# Patient Record
Sex: FEMALE | Race: BLACK OR AFRICAN AMERICAN | NOT HISPANIC OR LATINO
[De-identification: names, ages, dates, MRNs, and addresses within clinical notes are randomized per-mention and may not be internally consistent; named-entity substitution may affect disease eponyms.]

---

## 2020-10-05 ENCOUNTER — TRANSCRIPTION ENCOUNTER (OUTPATIENT)
Age: 35
End: 2020-10-05

## 2020-10-05 VITALS
OXYGEN SATURATION: 100 % | SYSTOLIC BLOOD PRESSURE: 116 MMHG | RESPIRATION RATE: 18 BRPM | DIASTOLIC BLOOD PRESSURE: 74 MMHG | TEMPERATURE: 98 F | WEIGHT: 149.91 LBS | HEART RATE: 80 BPM | HEIGHT: 66 IN

## 2020-10-06 ENCOUNTER — INPATIENT (INPATIENT)
Facility: HOSPITAL | Age: 35
LOS: 3 days | Discharge: HOME | DRG: 742 | End: 2020-10-10
Attending: OBSTETRICS & GYNECOLOGY | Admitting: OBSTETRICS & GYNECOLOGY
Payer: COMMERCIAL

## 2020-10-06 DIAGNOSIS — R18.8 OTHER ASCITES: ICD-10-CM
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
## 2020-10-06 DIAGNOSIS — D25.9 LEIOMYOMA OF UTERUS, UNSPECIFIED: ICD-10-CM

## 2020-10-06 DIAGNOSIS — Z41.9 ENCOUNTER FOR PROCEDURE FOR PURPOSES OTHER THAN REMEDYING HEALTH STATE, UNSPECIFIED: Chronic | ICD-10-CM

## 2020-10-06 DIAGNOSIS — N99.840 POSTPROCEDURAL HEMATOMA OF A GENITOURINARY SYSTEM ORGAN OR STRUCTURE FOLLOWING A GENITOURINARY SYSTEM PROCEDURE: ICD-10-CM

## 2020-10-06 DIAGNOSIS — T81.44XA SEPSIS FOLLOWING A PROCEDURE, INITIAL ENCOUNTER: ICD-10-CM
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
## 2020-10-06 DIAGNOSIS — A41.9 SEPSIS, UNSPECIFIED ORGANISM: ICD-10-CM

## 2020-10-06 DIAGNOSIS — N95.0 POSTMENOPAUSAL BLEEDING: ICD-10-CM

## 2020-10-06 DIAGNOSIS — D62 ACUTE POSTHEMORRHAGIC ANEMIA: ICD-10-CM

## 2020-10-06 DIAGNOSIS — T81.43XA INFECTION FOLLOWING A PROCEDURE, ORGAN AND SPACE SURGICAL SITE, INITIAL ENCOUNTER: ICD-10-CM

## 2020-10-06 DIAGNOSIS — K66.8 OTHER SPECIFIED DISORDERS OF PERITONEUM: ICD-10-CM

## 2020-10-06 LAB
HCT VFR BLD CALC: 31.7 % — LOW (ref 34.5–45)
HGB BLD-MCNC: 9.6 G/DL — LOW (ref 11.5–15.5)
MCHC RBC-ENTMCNC: 25.5 PG — LOW (ref 27–34)
MCHC RBC-ENTMCNC: 30.3 GM/DL — LOW (ref 32–36)
MCV RBC AUTO: 84.3 FL — SIGNIFICANT CHANGE UP (ref 80–100)
NRBC # BLD: 0 /100 WBCS — SIGNIFICANT CHANGE UP (ref 0–0)
PLATELET # BLD AUTO: 182 K/UL — SIGNIFICANT CHANGE UP (ref 150–400)
RBC # BLD: 3.76 M/UL — LOW (ref 3.8–5.2)
RBC # FLD: 15.1 % — HIGH (ref 10.3–14.5)
WBC # BLD: 13.71 K/UL — HIGH (ref 3.8–10.5)
WBC # FLD AUTO: 13.71 K/UL — HIGH (ref 3.8–10.5)

## 2020-10-06 PROCEDURE — 88305 TISSUE EXAM BY PATHOLOGIST: CPT | Mod: 26

## 2020-10-06 RX ORDER — BUPIVACAINE 13.3 MG/ML
20 INJECTION, SUSPENSION, LIPOSOMAL INFILTRATION ONCE
Refills: 0 | Status: DISCONTINUED | OUTPATIENT
Start: 2020-10-06 | End: 2020-10-10

## 2020-10-06 RX ORDER — ACETAMINOPHEN 500 MG
1000 TABLET ORAL EVERY 8 HOURS
Refills: 0 | Status: DISCONTINUED | OUTPATIENT
Start: 2020-10-06 | End: 2020-10-07

## 2020-10-06 RX ORDER — HYDROMORPHONE HYDROCHLORIDE 2 MG/ML
0.5 INJECTION INTRAMUSCULAR; INTRAVENOUS; SUBCUTANEOUS
Refills: 0 | Status: DISCONTINUED | OUTPATIENT
Start: 2020-10-06 | End: 2020-10-10

## 2020-10-06 RX ORDER — SODIUM CHLORIDE 9 MG/ML
1000 INJECTION, SOLUTION INTRAVENOUS
Refills: 0 | Status: DISCONTINUED | OUTPATIENT
Start: 2020-10-06 | End: 2020-10-07

## 2020-10-06 RX ORDER — OXYCODONE HYDROCHLORIDE 5 MG/1
10 TABLET ORAL EVERY 4 HOURS
Refills: 0 | Status: DISCONTINUED | OUTPATIENT
Start: 2020-10-06 | End: 2020-10-10

## 2020-10-06 RX ORDER — KETOROLAC TROMETHAMINE 30 MG/ML
30 SYRINGE (ML) INJECTION EVERY 8 HOURS
Refills: 0 | Status: DISCONTINUED | OUTPATIENT
Start: 2020-10-06 | End: 2020-10-07

## 2020-10-06 RX ORDER — OXYCODONE HYDROCHLORIDE 5 MG/1
5 TABLET ORAL
Refills: 0 | Status: DISCONTINUED | OUTPATIENT
Start: 2020-10-06 | End: 2020-10-10

## 2020-10-06 RX ORDER — ONDANSETRON 8 MG/1
8 TABLET, FILM COATED ORAL EVERY 8 HOURS
Refills: 0 | Status: DISCONTINUED | OUTPATIENT
Start: 2020-10-06 | End: 2020-10-10

## 2020-10-06 RX ORDER — SENNA PLUS 8.6 MG/1
1 TABLET ORAL AT BEDTIME
Refills: 0 | Status: DISCONTINUED | OUTPATIENT
Start: 2020-10-06 | End: 2020-10-09

## 2020-10-06 RX ORDER — SIMETHICONE 80 MG/1
80 TABLET, CHEWABLE ORAL EVERY 6 HOURS
Refills: 0 | Status: DISCONTINUED | OUTPATIENT
Start: 2020-10-06 | End: 2020-10-10

## 2020-10-06 RX ADMIN — Medication 30 MILLIGRAM(S): at 18:03

## 2020-10-06 RX ADMIN — HYDROMORPHONE HYDROCHLORIDE 0.5 MILLIGRAM(S): 2 INJECTION INTRAMUSCULAR; INTRAVENOUS; SUBCUTANEOUS at 16:40

## 2020-10-06 RX ADMIN — HYDROMORPHONE HYDROCHLORIDE 0.5 MILLIGRAM(S): 2 INJECTION INTRAMUSCULAR; INTRAVENOUS; SUBCUTANEOUS at 16:55

## 2020-10-06 RX ADMIN — SIMETHICONE 80 MILLIGRAM(S): 80 TABLET, CHEWABLE ORAL at 22:50

## 2020-10-06 RX ADMIN — Medication 30 MILLIGRAM(S): at 18:15

## 2020-10-06 NOTE — BRIEF OPERATIVE NOTE - NSICDXBRIEFPREOP_GEN_ALL_CORE_FT
PRE-OP DIAGNOSIS:  Abnormal uterine bleeding 06-Oct-2020 13:22:36  Angeles Holliday  Fibroid uterus 06-Oct-2020 13:22:26  Angeles Holliday

## 2020-10-06 NOTE — H&P ADULT - HISTORY OF PRESENT ILLNESS
35 yo P0 presents for scheduled abdominal myomectomy for enlarge fibroid uterus with abnormal uterine bleeding. Patient reports that she feels overall well today but nervous. She denies vaginal bleeding today but endorses a history of very heavy periods with passage of clots and resulting anemia. She denies abdominal discomfort, fevers, chills, SOB, chest pain, or any complaints.

## 2020-10-06 NOTE — H&P ADULT - ASSESSMENT
35 yo P0 presents for schedule abdominal myomectomy for a large fibroid uterus and abnormal uterine bleeding.   - Admit for procedure  - NPO with IV fluids  - Consent obtained by attending Dr. Mejia   - Will give exparel for additional pain control

## 2020-10-06 NOTE — BRIEF OPERATIVE NOTE - OPERATION/FINDINGS
26 week fibroid uterus with a posterior myoma extending to the inferior edge of the liver; 20 fibroids excised; endometrial cavity entered when removing the 11 cm fibroid and reapproximated; Stratafix suture used to close all incisions. Intercede x3 placed over uterine incisions. Care taken not to disrupt the fallopian tubes or ovaries. Contreras contents remained clear throughout the entire case. Exparel with marcaine injected into the fascia and subq. Fascia closed with 0 vicryl suture. Subq closed with plain gut suture. skin closed with ensorb stapler and pressure dressing applied.

## 2020-10-06 NOTE — PROGRESS NOTE ADULT - ASSESSMENT
33yo P0 POD0 s/p abdominal myomectomy, uncomplicated                                   1. Neuro/Pain:  controlled with tylenol 1000mg Q6H, dilaudid 0.5mg Q15min PRN, oxycodone 5 &10mg prn, toradol 30mg Q8H   2  CV:   VS per routine  3. Pulm: Encourage ISS  4. GI: clear liquid diet  5. :  Contreras in place, TOV in AM  6. Heme: AM CBC  8. DVT ppx: SCDs  9. Dispo: continue inpatient care   35yo P0 POD0 s/p abdominal myomectomy                                   1. Neuro/Pain:  controlled with tylenol 1000mg Q6H, dilaudid 0.5mg Q15min PRN, oxycodone 5 &10mg prn, toradol 30mg Q8H   2  CV:   VS per routine  3. Pulm: Encourage ISS  4. GI: clear liquid diet  5. :  Contreras in place, TOV in AM  6. Heme: AM CBC  8. DVT ppx: SCDs  9. Dispo: continue inpatient care

## 2020-10-06 NOTE — PROGRESS NOTE ADULT - SUBJECTIVE AND OBJECTIVE BOX
GYN POST-OPERATIVE CHECK  Patient seen at bedside.  Pain controlled. Tolerating clear liquid diet.  No OOB yet.  Contreras in place. Reports feeling drowsy. No flatus yet.    Denies CP, palpitations, SOB, fever, chills, nausea, vomiting.    Vital Signs Last 24 Hrs  T(C): 36.7 (06 Oct 2020 13:15), Max: 36.7 (06 Oct 2020 13:15)  T(F): 98 (06 Oct 2020 13:15), Max: 98 (06 Oct 2020 13:15)  HR: 82 (06 Oct 2020 15:15) (74 - 88)  BP: 95/57 (06 Oct 2020 15:15) (88/49 - 95/57)  BP(mean): 71 (06 Oct 2020 15:15) (65 - 71)  RR: 18 (06 Oct 2020 15:15) (10 - 19)  SpO2: 100% (06 Oct 2020 15:15) (96% - 100%)    Physical Exam:  Gen: No Acute Distress  Pulm: Clear to auscultation bilaterally  GI: soft, appropriately tender, non-distended, +BS, no rebound, no guarding.  Dressing C/D/I  : Contreras in place  Ext: SCDs in place, no edema/erythema/tenderness    I&O's Summary    06 Oct 2020 07:01  -  06 Oct 2020 18:52  --------------------------------------------------------  IN: 750 mL / OUT: 140 mL / NET: 610 mL      MEDICATIONS  (STANDING):  acetaminophen   Tablet .. 1000 milliGRAM(s) Oral every 8 hours  BUpivacaine liposome 1.3% Injectable (no eMAR) 20 milliLiter(s) Local Injection once  ketorolac   Injectable 30 milliGRAM(s) IV Push every 8 hours  lactated ringers. 1000 milliLiter(s) (125 mL/Hr) IV Continuous <Continuous>    MEDICATIONS  (PRN):  HYDROmorphone  Injectable 0.5 milliGRAM(s) IV Push every 15 minutes PRN Severe Pain (7 - 10)  ondansetron    Tablet 8 milliGRAM(s) Oral every 8 hours PRN Nausea and/or Vomiting  oxyCODONE    IR 5 milliGRAM(s) Oral every 3 hours PRN Moderate Pain (4 - 6)  oxyCODONE    IR 10 milliGRAM(s) Oral every 4 hours PRN Severe Pain (7 - 10)  senna 1 Tablet(s) Oral at bedtime PRN Constipation  simethicone 80 milliGRAM(s) Chew every 6 hours PRN Gas    Allergies    No Known Allergies    Intolerances        LABS:                        9.6    13.71 )-----------( 182      ( 06 Oct 2020 13:53 )             31.7

## 2020-10-07 LAB
ANION GAP SERPL CALC-SCNC: 7 MMOL/L — SIGNIFICANT CHANGE UP (ref 5–17)
BUN SERPL-MCNC: 9 MG/DL — SIGNIFICANT CHANGE UP (ref 7–23)
CALCIUM SERPL-MCNC: 7.4 MG/DL — LOW (ref 8.4–10.5)
CHLORIDE SERPL-SCNC: 105 MMOL/L — SIGNIFICANT CHANGE UP (ref 96–108)
CO2 SERPL-SCNC: 25 MMOL/L — SIGNIFICANT CHANGE UP (ref 22–31)
CREAT SERPL-MCNC: 0.77 MG/DL — SIGNIFICANT CHANGE UP (ref 0.5–1.3)
GLUCOSE SERPL-MCNC: 127 MG/DL — HIGH (ref 70–99)
HCT VFR BLD CALC: 18.9 % — CRITICAL LOW (ref 34.5–45)
HCT VFR BLD CALC: 24.7 % — LOW (ref 34.5–45)
HGB BLD-MCNC: 5.9 G/DL — CRITICAL LOW (ref 11.5–15.5)
HGB BLD-MCNC: 8 G/DL — LOW (ref 11.5–15.5)
MCHC RBC-ENTMCNC: 25.5 PG — LOW (ref 27–34)
MCHC RBC-ENTMCNC: 27.7 PG — SIGNIFICANT CHANGE UP (ref 27–34)
MCHC RBC-ENTMCNC: 31.2 GM/DL — LOW (ref 32–36)
MCHC RBC-ENTMCNC: 32.4 GM/DL — SIGNIFICANT CHANGE UP (ref 32–36)
MCV RBC AUTO: 81.8 FL — SIGNIFICANT CHANGE UP (ref 80–100)
MCV RBC AUTO: 85.5 FL — SIGNIFICANT CHANGE UP (ref 80–100)
NRBC # BLD: 0 /100 WBCS — SIGNIFICANT CHANGE UP (ref 0–0)
NRBC # BLD: 0 /100 WBCS — SIGNIFICANT CHANGE UP (ref 0–0)
PLATELET # BLD AUTO: 116 K/UL — LOW (ref 150–400)
PLATELET # BLD AUTO: 138 K/UL — LOW (ref 150–400)
POTASSIUM SERPL-MCNC: 4.3 MMOL/L — SIGNIFICANT CHANGE UP (ref 3.5–5.3)
POTASSIUM SERPL-SCNC: 4.3 MMOL/L — SIGNIFICANT CHANGE UP (ref 3.5–5.3)
RBC # BLD: 2.31 M/UL — LOW (ref 3.8–5.2)
RBC # BLD: 2.89 M/UL — LOW (ref 3.8–5.2)
RBC # FLD: 14.8 % — HIGH (ref 10.3–14.5)
RBC # FLD: 15.7 % — HIGH (ref 10.3–14.5)
SODIUM SERPL-SCNC: 137 MMOL/L — SIGNIFICANT CHANGE UP (ref 135–145)
WBC # BLD: 10.33 K/UL — SIGNIFICANT CHANGE UP (ref 3.8–10.5)
WBC # BLD: 12.53 K/UL — HIGH (ref 3.8–10.5)
WBC # FLD AUTO: 10.33 K/UL — SIGNIFICANT CHANGE UP (ref 3.8–10.5)
WBC # FLD AUTO: 12.53 K/UL — HIGH (ref 3.8–10.5)

## 2020-10-07 PROCEDURE — 74174 CTA ABD&PLVS W/CONTRAST: CPT | Mod: 26

## 2020-10-07 PROCEDURE — 76700 US EXAM ABDOM COMPLETE: CPT | Mod: 26

## 2020-10-07 PROCEDURE — 76856 US EXAM PELVIC COMPLETE: CPT | Mod: 26

## 2020-10-07 RX ORDER — DIPHENHYDRAMINE HCL 50 MG
25 CAPSULE ORAL ONCE
Refills: 0 | Status: DISCONTINUED | OUTPATIENT
Start: 2020-10-07 | End: 2020-10-09

## 2020-10-07 RX ORDER — SODIUM CHLORIDE 9 MG/ML
1000 INJECTION, SOLUTION INTRAVENOUS
Refills: 0 | Status: DISCONTINUED | OUTPATIENT
Start: 2020-10-07 | End: 2020-10-09

## 2020-10-07 RX ORDER — CEFAZOLIN SODIUM 1 G
VIAL (EA) INJECTION
Refills: 0 | Status: DISCONTINUED | OUTPATIENT
Start: 2020-10-07 | End: 2020-10-10

## 2020-10-07 RX ORDER — CEFAZOLIN SODIUM 1 G
VIAL (EA) INJECTION
Refills: 0 | Status: DISCONTINUED | OUTPATIENT
Start: 2020-10-07 | End: 2020-10-07

## 2020-10-07 RX ORDER — CEFAZOLIN SODIUM 1 G
2000 VIAL (EA) INJECTION ONCE
Refills: 0 | Status: COMPLETED | OUTPATIENT
Start: 2020-10-07 | End: 2020-10-07

## 2020-10-07 RX ORDER — CEFAZOLIN SODIUM 1 G
2000 VIAL (EA) INJECTION EVERY 8 HOURS
Refills: 0 | Status: DISCONTINUED | OUTPATIENT
Start: 2020-10-08 | End: 2020-10-10

## 2020-10-07 RX ORDER — KETOROLAC TROMETHAMINE 30 MG/ML
30 SYRINGE (ML) INJECTION EVERY 6 HOURS
Refills: 0 | Status: DISCONTINUED | OUTPATIENT
Start: 2020-10-07 | End: 2020-10-09

## 2020-10-07 RX ORDER — ACETAMINOPHEN 500 MG
1000 TABLET ORAL EVERY 6 HOURS
Refills: 0 | Status: DISCONTINUED | OUTPATIENT
Start: 2020-10-07 | End: 2020-10-10

## 2020-10-07 RX ORDER — CEFAZOLIN SODIUM 1 G
2000 VIAL (EA) INJECTION ONCE
Refills: 0 | Status: DISCONTINUED | OUTPATIENT
Start: 2020-10-07 | End: 2020-10-07

## 2020-10-07 RX ADMIN — OXYCODONE HYDROCHLORIDE 10 MILLIGRAM(S): 5 TABLET ORAL at 06:35

## 2020-10-07 RX ADMIN — Medication 1000 MILLIGRAM(S): at 21:06

## 2020-10-07 RX ADMIN — OXYCODONE HYDROCHLORIDE 10 MILLIGRAM(S): 5 TABLET ORAL at 19:03

## 2020-10-07 RX ADMIN — Medication 30 MILLIGRAM(S): at 21:20

## 2020-10-07 RX ADMIN — OXYCODONE HYDROCHLORIDE 10 MILLIGRAM(S): 5 TABLET ORAL at 01:01

## 2020-10-07 RX ADMIN — OXYCODONE HYDROCHLORIDE 10 MILLIGRAM(S): 5 TABLET ORAL at 11:21

## 2020-10-07 RX ADMIN — Medication 100 MILLIGRAM(S): at 22:11

## 2020-10-07 RX ADMIN — Medication 30 MILLIGRAM(S): at 11:51

## 2020-10-07 RX ADMIN — OXYCODONE HYDROCHLORIDE 10 MILLIGRAM(S): 5 TABLET ORAL at 00:28

## 2020-10-07 RX ADMIN — Medication 30 MILLIGRAM(S): at 11:21

## 2020-10-07 RX ADMIN — Medication 1000 MILLIGRAM(S): at 22:06

## 2020-10-07 RX ADMIN — Medication 30 MILLIGRAM(S): at 03:01

## 2020-10-07 RX ADMIN — OXYCODONE HYDROCHLORIDE 5 MILLIGRAM(S): 5 TABLET ORAL at 15:35

## 2020-10-07 RX ADMIN — SODIUM CHLORIDE 125 MILLILITER(S): 9 INJECTION, SOLUTION INTRAVENOUS at 00:29

## 2020-10-07 RX ADMIN — OXYCODONE HYDROCHLORIDE 5 MILLIGRAM(S): 5 TABLET ORAL at 16:05

## 2020-10-07 RX ADMIN — SIMETHICONE 80 MILLIGRAM(S): 80 TABLET, CHEWABLE ORAL at 06:13

## 2020-10-07 RX ADMIN — OXYCODONE HYDROCHLORIDE 10 MILLIGRAM(S): 5 TABLET ORAL at 11:51

## 2020-10-07 RX ADMIN — Medication 30 MILLIGRAM(S): at 21:06

## 2020-10-07 RX ADMIN — OXYCODONE HYDROCHLORIDE 10 MILLIGRAM(S): 5 TABLET ORAL at 06:40

## 2020-10-07 RX ADMIN — SODIUM CHLORIDE 55 MILLILITER(S): 9 INJECTION, SOLUTION INTRAVENOUS at 15:15

## 2020-10-07 RX ADMIN — OXYCODONE HYDROCHLORIDE 10 MILLIGRAM(S): 5 TABLET ORAL at 18:33

## 2020-10-07 RX ADMIN — OXYCODONE HYDROCHLORIDE 5 MILLIGRAM(S): 5 TABLET ORAL at 23:02

## 2020-10-07 RX ADMIN — Medication 30 MILLIGRAM(S): at 02:46

## 2020-10-07 NOTE — PROVIDER CONTACT NOTE (CRITICAL VALUE NOTIFICATION) - ASSESSMENT
Pt appears tired. No vaginal bleeding or hematoma on abdomen. Tachycardic at 111, BP 91/60, SpO2 99%, RR 18.

## 2020-10-07 NOTE — PROGRESS NOTE ADULT - ASSESSMENT
35yo P0 POD1 s/p abdominal myomectomy. Incision dressing to be removed at around 1pm today.                                  1. Neuro/Pain:  controlled with tylenol 1000mg Q6H, dilaudid 0.5mg Q15min PRN, oxycodone 5 &10mg prn, toradol 30mg Q8H   2  CV:   VS per routine  3. Pulm: Encourage ISS  4. GI: clear liquid diet until passing flatus  5. :  Contreras to be removed this AM,  TOV in AM  6. Heme: AM CBC shows Hgb of 5.9. Will transfuse 2 units pRBC, Abd US ordered to evaluate for subcapsular hematomas.  8. DVT ppx: SCDs   9. Dispo: continue inpatient care   35yo P0 POD1 s/p abdominal myomectomy. Incision dressing to be removed at around 1pm today.                                  1. Neuro/Pain:  controlled with tylenol 1000mg Q6H, dilaudid 0.5mg Q15min PRN, oxycodone 5 &10mg prn, toradol 30mg Q8H   2  CV:   VS per routine  3. Pulm: Encourage ISS  4. GI: clear liquid diet until passing flatus  5. :  Contreras to be removed this AM,  TOV in AM  6. Heme: AM CBC shows Hgb of 5.9. Will transfuse 2 units pRBC, Abd US ordered to evaluate for subcapsular hematomas.  8. DVT ppx: SCDs. No Lovenox for now.  9. Dispo: continue inpatient care

## 2020-10-07 NOTE — PROGRESS NOTE ADULT - SUBJECTIVE AND OBJECTIVE BOX
GYN AM rounding Note.  Patient seen at bedside.  Pain controlled. Tolerating clear liquid diet.  No OOB yet.  Contreras in place. Reports feeling drowsy. No flatus yet.    Denies CP, palpitations, SOB, fever, chills, nausea, vomiting.    Vital Signs Last 24 Hrs  T(C): 36.7 (06 Oct 2020 13:15), Max: 36.7 (06 Oct 2020 13:15)  T(F): 98 (06 Oct 2020 13:15), Max: 98 (06 Oct 2020 13:15)  HR: 82 (06 Oct 2020 15:15) (74 - 88)  BP: 95/57 (06 Oct 2020 15:15) (88/49 - 95/57)  BP(mean): 71 (06 Oct 2020 15:15) (65 - 71)  RR: 18 (06 Oct 2020 15:15) (10 - 19)  SpO2: 100% (06 Oct 2020 15:15) (96% - 100%)    Physical Exam:  Gen: No Acute Distress  Pulm: Clear to auscultation bilaterally  GI: soft, appropriately tender, non-distended, +BS, no rebound, no guarding.  Dressing C/D/I  : Contreras in place  Ext: SCDs in place, no edema/erythema/tenderness    I&O's Summary    06 Oct 2020 07:01  -  06 Oct 2020 18:52  --------------------------------------------------------  IN: 750 mL / OUT: 140 mL / NET: 610 mL      MEDICATIONS  (STANDING):  acetaminophen   Tablet .. 1000 milliGRAM(s) Oral every 8 hours  BUpivacaine liposome 1.3% Injectable (no eMAR) 20 milliLiter(s) Local Injection once  ketorolac   Injectable 30 milliGRAM(s) IV Push every 8 hours  lactated ringers. 1000 milliLiter(s) (125 mL/Hr) IV Continuous <Continuous>    MEDICATIONS  (PRN):  HYDROmorphone  Injectable 0.5 milliGRAM(s) IV Push every 15 minutes PRN Severe Pain (7 - 10)  ondansetron    Tablet 8 milliGRAM(s) Oral every 8 hours PRN Nausea and/or Vomiting  oxyCODONE    IR 5 milliGRAM(s) Oral every 3 hours PRN Moderate Pain (4 - 6)  oxyCODONE    IR 10 milliGRAM(s) Oral every 4 hours PRN Severe Pain (7 - 10)  senna 1 Tablet(s) Oral at bedtime PRN Constipation  simethicone 80 milliGRAM(s) Chew every 6 hours PRN Gas    Allergies    No Known Allergies    Intolerances        LABS:                        9.6    13.71 )-----------( 182      ( 06 Oct 2020 13:53 )             31.7

## 2020-10-07 NOTE — PROGRESS NOTE ADULT - SUBJECTIVE AND OBJECTIVE BOX
Feels weak  Afebrile. P 102, (H111 at 5 AM) BP 92/57  Lungs clear  Abdomen soft, no rebound, dressing in place  Ext No C/C/E    Hb 5.9  Imp Anemia secondary to blood loss during surgery  Unlikely intra abdominal bleed    Plan  Being transfused first of 2 u PRBC now  Will get sono to r/o bleeding from myoma site   May need CT scan

## 2020-10-07 NOTE — PROGRESS NOTE ADULT - SUBJECTIVE AND OBJECTIVE BOX
C/o abdominal pain. Awake, alert and oriented  T 101.5, Pulse 127, /74.  Abdomen soft, slightly more tender than AM    Hb at 7.45 PM 8.0/24.7  Sono showed ascites and fluid around liver  CT showed no active bleeding but fluid in abdomen and around myometrium ( probably hematoma). Official report pending    Plan  Transfer to step down unit  Maintain NPO tonight  Start IV Ancef  Rpt H/H in 4 hours

## 2020-10-08 LAB
ANION GAP SERPL CALC-SCNC: 10 MMOL/L — SIGNIFICANT CHANGE UP (ref 5–17)
ANISOCYTOSIS BLD QL: SLIGHT — SIGNIFICANT CHANGE UP
BASOPHILS # BLD AUTO: 0 K/UL — SIGNIFICANT CHANGE UP (ref 0–0.2)
BASOPHILS # BLD AUTO: 0.02 K/UL — SIGNIFICANT CHANGE UP (ref 0–0.2)
BASOPHILS NFR BLD AUTO: 0 % — SIGNIFICANT CHANGE UP (ref 0–2)
BASOPHILS NFR BLD AUTO: 0.2 % — SIGNIFICANT CHANGE UP (ref 0–2)
BUN SERPL-MCNC: 8 MG/DL — SIGNIFICANT CHANGE UP (ref 7–23)
CALCIUM SERPL-MCNC: 7.8 MG/DL — LOW (ref 8.4–10.5)
CHLORIDE SERPL-SCNC: 101 MMOL/L — SIGNIFICANT CHANGE UP (ref 96–108)
CO2 SERPL-SCNC: 25 MMOL/L — SIGNIFICANT CHANGE UP (ref 22–31)
CREAT SERPL-MCNC: 0.65 MG/DL — SIGNIFICANT CHANGE UP (ref 0.5–1.3)
ELLIPTOCYTES BLD QL SMEAR: SLIGHT — SIGNIFICANT CHANGE UP
EOSINOPHIL # BLD AUTO: 0 K/UL — SIGNIFICANT CHANGE UP (ref 0–0.5)
EOSINOPHIL # BLD AUTO: 0.02 K/UL — SIGNIFICANT CHANGE UP (ref 0–0.5)
EOSINOPHIL NFR BLD AUTO: 0 % — SIGNIFICANT CHANGE UP (ref 0–6)
EOSINOPHIL NFR BLD AUTO: 0.2 % — SIGNIFICANT CHANGE UP (ref 0–6)
GIANT PLATELETS BLD QL SMEAR: PRESENT — SIGNIFICANT CHANGE UP
GLUCOSE SERPL-MCNC: 94 MG/DL — SIGNIFICANT CHANGE UP (ref 70–99)
HCT VFR BLD CALC: 22 % — LOW (ref 34.5–45)
HCT VFR BLD CALC: 26 % — LOW (ref 34.5–45)
HCT VFR BLD CALC: 26.2 % — LOW (ref 34.5–45)
HGB BLD-MCNC: 7.2 G/DL — LOW (ref 11.5–15.5)
HGB BLD-MCNC: 8.4 G/DL — LOW (ref 11.5–15.5)
HGB BLD-MCNC: 8.4 G/DL — LOW (ref 11.5–15.5)
IMM GRANULOCYTES NFR BLD AUTO: 0.5 % — SIGNIFICANT CHANGE UP (ref 0–1.5)
LYMPHOCYTES # BLD AUTO: 0.39 K/UL — LOW (ref 1–3.3)
LYMPHOCYTES # BLD AUTO: 1.42 K/UL — SIGNIFICANT CHANGE UP (ref 1–3.3)
LYMPHOCYTES # BLD AUTO: 11.9 % — LOW (ref 13–44)
LYMPHOCYTES # BLD AUTO: 2.6 % — LOW (ref 13–44)
MACROCYTES BLD QL: SLIGHT — SIGNIFICANT CHANGE UP
MAGNESIUM SERPL-MCNC: 1.7 MG/DL — SIGNIFICANT CHANGE UP (ref 1.6–2.6)
MANUAL SMEAR VERIFICATION: SIGNIFICANT CHANGE UP
MCHC RBC-ENTMCNC: 27.3 PG — SIGNIFICANT CHANGE UP (ref 27–34)
MCHC RBC-ENTMCNC: 27.5 PG — SIGNIFICANT CHANGE UP (ref 27–34)
MCHC RBC-ENTMCNC: 28 PG — SIGNIFICANT CHANGE UP (ref 27–34)
MCHC RBC-ENTMCNC: 32.1 GM/DL — SIGNIFICANT CHANGE UP (ref 32–36)
MCHC RBC-ENTMCNC: 32.3 GM/DL — SIGNIFICANT CHANGE UP (ref 32–36)
MCHC RBC-ENTMCNC: 32.7 GM/DL — SIGNIFICANT CHANGE UP (ref 32–36)
MCV RBC AUTO: 85 FL — SIGNIFICANT CHANGE UP (ref 80–100)
MCV RBC AUTO: 85.1 FL — SIGNIFICANT CHANGE UP (ref 80–100)
MCV RBC AUTO: 85.6 FL — SIGNIFICANT CHANGE UP (ref 80–100)
MONOCYTES # BLD AUTO: 0.27 K/UL — SIGNIFICANT CHANGE UP (ref 0–0.9)
MONOCYTES # BLD AUTO: 1.19 K/UL — HIGH (ref 0–0.9)
MONOCYTES NFR BLD AUTO: 1.8 % — LOW (ref 2–14)
MONOCYTES NFR BLD AUTO: 9.9 % — SIGNIFICANT CHANGE UP (ref 2–14)
NEUTROPHILS # BLD AUTO: 14.18 K/UL — HIGH (ref 1.8–7.4)
NEUTROPHILS # BLD AUTO: 9.27 K/UL — HIGH (ref 1.8–7.4)
NEUTROPHILS NFR BLD AUTO: 77.3 % — HIGH (ref 43–77)
NEUTROPHILS NFR BLD AUTO: 93.9 % — HIGH (ref 43–77)
NEUTS BAND # BLD: 1.7 % — SIGNIFICANT CHANGE UP (ref 0–8)
NRBC # BLD: 0 /100 WBCS — SIGNIFICANT CHANGE UP (ref 0–0)
NRBC # BLD: 0 /100 WBCS — SIGNIFICANT CHANGE UP (ref 0–0)
PHOSPHATE SERPL-MCNC: 2.2 MG/DL — LOW (ref 2.5–4.5)
PLAT MORPH BLD: ABNORMAL
PLATELET # BLD AUTO: 117 K/UL — LOW (ref 150–400)
PLATELET # BLD AUTO: 118 K/UL — LOW (ref 150–400)
PLATELET # BLD AUTO: 119 K/UL — LOW (ref 150–400)
POIKILOCYTOSIS BLD QL AUTO: SLIGHT — SIGNIFICANT CHANGE UP
POTASSIUM SERPL-MCNC: 3.6 MMOL/L — SIGNIFICANT CHANGE UP (ref 3.5–5.3)
POTASSIUM SERPL-SCNC: 3.6 MMOL/L — SIGNIFICANT CHANGE UP (ref 3.5–5.3)
RBC # BLD: 2.57 M/UL — LOW (ref 3.8–5.2)
RBC # BLD: 3.06 M/UL — LOW (ref 3.8–5.2)
RBC # BLD: 3.08 M/UL — LOW (ref 3.8–5.2)
RBC # FLD: 14.9 % — HIGH (ref 10.3–14.5)
RBC # FLD: 15 % — HIGH (ref 10.3–14.5)
RBC # FLD: 15 % — HIGH (ref 10.3–14.5)
RBC BLD AUTO: ABNORMAL
SODIUM SERPL-SCNC: 136 MMOL/L — SIGNIFICANT CHANGE UP (ref 135–145)
WBC # BLD: 11.98 K/UL — HIGH (ref 3.8–10.5)
WBC # BLD: 14.02 K/UL — HIGH (ref 3.8–10.5)
WBC # BLD: 14.83 K/UL — HIGH (ref 3.8–10.5)
WBC # FLD AUTO: 11.98 K/UL — HIGH (ref 3.8–10.5)
WBC # FLD AUTO: 14.02 K/UL — HIGH (ref 3.8–10.5)
WBC # FLD AUTO: 14.83 K/UL — HIGH (ref 3.8–10.5)

## 2020-10-08 RX ORDER — SODIUM,POTASSIUM PHOSPHATES 278-250MG
3 POWDER IN PACKET (EA) ORAL ONCE
Refills: 0 | Status: COMPLETED | OUTPATIENT
Start: 2020-10-08 | End: 2020-10-08

## 2020-10-08 RX ORDER — OXYCODONE HYDROCHLORIDE 5 MG/1
5 TABLET ORAL ONCE
Refills: 0 | Status: DISCONTINUED | OUTPATIENT
Start: 2020-10-08 | End: 2020-10-08

## 2020-10-08 RX ADMIN — Medication 1000 MILLIGRAM(S): at 21:41

## 2020-10-08 RX ADMIN — Medication 30 MILLIGRAM(S): at 13:58

## 2020-10-08 RX ADMIN — Medication 100 MILLIGRAM(S): at 16:23

## 2020-10-08 RX ADMIN — Medication 100 MILLIGRAM(S): at 06:05

## 2020-10-08 RX ADMIN — OXYCODONE HYDROCHLORIDE 5 MILLIGRAM(S): 5 TABLET ORAL at 00:00

## 2020-10-08 RX ADMIN — Medication 1000 MILLIGRAM(S): at 17:23

## 2020-10-08 RX ADMIN — Medication 30 MILLIGRAM(S): at 12:58

## 2020-10-08 RX ADMIN — Medication 30 MILLIGRAM(S): at 06:08

## 2020-10-08 RX ADMIN — Medication 1000 MILLIGRAM(S): at 11:30

## 2020-10-08 RX ADMIN — Medication 1000 MILLIGRAM(S): at 21:42

## 2020-10-08 RX ADMIN — OXYCODONE HYDROCHLORIDE 5 MILLIGRAM(S): 5 TABLET ORAL at 02:00

## 2020-10-08 RX ADMIN — Medication 30 MILLIGRAM(S): at 06:20

## 2020-10-08 RX ADMIN — OXYCODONE HYDROCHLORIDE 5 MILLIGRAM(S): 5 TABLET ORAL at 01:12

## 2020-10-08 RX ADMIN — SODIUM CHLORIDE 55 MILLILITER(S): 9 INJECTION, SOLUTION INTRAVENOUS at 17:38

## 2020-10-08 RX ADMIN — Medication 3 TABLET(S): at 12:59

## 2020-10-08 RX ADMIN — Medication 1000 MILLIGRAM(S): at 03:15

## 2020-10-08 RX ADMIN — Medication 1000 MILLIGRAM(S): at 10:02

## 2020-10-08 RX ADMIN — Medication 30 MILLIGRAM(S): at 19:24

## 2020-10-08 RX ADMIN — Medication 1000 MILLIGRAM(S): at 16:23

## 2020-10-08 RX ADMIN — Medication 1000 MILLIGRAM(S): at 03:45

## 2020-10-08 NOTE — PROGRESS NOTE ADULT - SUBJECTIVE AND OBJECTIVE BOX
GYN PROGRESS NOTE    Patient evaluated at the bedside. Overnight events significant for fever to 101.5. CT angio performed overnight for concern for ongoing bleeding given patient's downtrending Hb- it was negative.     Patient reports that she feels tired and hot this morning. She is very thirsty. Amenable to ice chips but knows she cannot have liquids.   Denies CP/SOB/dizziness/nausea/vomiting/calf pain. No flatus yet. Voiding without issue.        O:   T(C): 38 (10-08-20 @ 06:51), Max: 38.6 (10-07-20 @ 20:51)  HR: 107 (10-08-20 @ 05:36) (96 - 127)  BP: 116/65 (10-08-20 @ 05:36) (91/58 - 119/74)  RR: 18 (10-08-20 @ 05:36) (17 - 24)  SpO2: 95% (10-08-20 @ 05:36) (90% - 100%)  Wt(kg): --    GEN: no acute distress   LUNGS: no respiratory distress, CTAB  ABD: soft, appropriately tender, +BS, no rebound or guarding   EXT: no calf tenderness, SCDs in place         10-07 @ 07:01  -  10-08 @ 07:00  --------------------------------------------------------  IN: 1316 mL / OUT: 2100 mL / NET: -784 mL

## 2020-10-08 NOTE — PROGRESS NOTE ADULT - SUBJECTIVE AND OBJECTIVE BOX
C/O Abdominal distensio. Passing flatus  Afebrile now. VSS, pulse 101  Lungs clear  Abdomen soft, distended, minimal rebound  Voiding freely  Hb 8.4  CT reviewed with IR. No active bleeding noted, hematoma and hemoperitoneum noted    Plan  Ambulate  Advance diet as tolerated  Observe CBC's

## 2020-10-08 NOTE — CHART NOTE - NSCHARTNOTEFT_GEN_A_CORE
Admitting Diagnosis:   Patient is a 34y old  Female who presents with a chief complaint of Scheduled open abdominal myomectomy (08 Oct 2020 07:06)    Consult: Yes [   ]  No [ x  ]    Reason for Initial Nutrition Assessment: LOS Nutrition Assessment     PAST MEDICAL & SURGICAL HISTORY:  Leiomyoma of uterus  Surgery, elective  left breast lumpectomy    Current Nutrition Order: CLD     PO Intake: Good (%) [   ]  Fair (50-75%) [   ] Poor (<25%) [   ]- N/A, diet just advanced to CLD. RD to follow.     GI Issues:   WDL, last BM 10/5, Unable to flatus  No n/v/d/c noted  No abd distention noted    Pain:  7/10 suprapubic pain- currently on bowel regime    Skin Integrity:  Surgical incision, linda score 20  No edema present  No pressure ulcers noted    Labs:   10-08    136  |  101  |  8   ----------------------------<  94  3.6   |  25  |  0.65    Ca    7.8<L>      08 Oct 2020 10:30  Phos  2.2     10-08    Medications:  MEDICATIONS  (STANDING):  acetaminophen   Tablet .. 1000 milliGRAM(s) Oral every 6 hours  BUpivacaine liposome 1.3% Injectable (no eMAR) 20 milliLiter(s) Local Injection once  ceFAZolin   IVPB      ceFAZolin   IVPB 2000 milliGRAM(s) IV Intermittent every 8 hours  ketorolac   Injectable 30 milliGRAM(s) IV Push every 6 hours  lactated ringers. 1000 milliLiter(s) (55 mL/Hr) IV Continuous <Continuous>    MEDICATIONS  (PRN):  diphenhydrAMINE 25 milliGRAM(s) Oral once PRN Rash and/or Itching  HYDROmorphone  Injectable 0.5 milliGRAM(s) IV Push every 15 minutes PRN Severe Pain (7 - 10)  ondansetron    Tablet 8 milliGRAM(s) Oral every 8 hours PRN Nausea and/or Vomiting  oxyCODONE    IR 5 milliGRAM(s) Oral every 3 hours PRN Moderate Pain (4 - 6)  oxyCODONE    IR 10 milliGRAM(s) Oral every 4 hours PRN Severe Pain (7 - 10)  senna 1 Tablet(s) Oral at bedtime PRN Constipation  simethicone 80 milliGRAM(s) Chew every 6 hours PRN Gas    Admitted Anthropometrics:  Height: 66" Weight: 150lbs, IBW 130lbs+/-10%, %%, BMI 24.2 kg/m2    Weight:  10/6 150lbs    Weight Change: UBW consistent with admission weight. Please trend weight biweekly.     Nutrition Focused Physical Exam: Completed [   ]  Unable to complete [   ]- N/A    Estimated energy needs:   ABW (68kg) used for calculations as pt between % of IBW. Needs adjusted for wound healing.   Kcal (25-30 kcal/kg): 6124-8871 kcal  Protein (1.2-1.4 g/kg pro): 82-95 g pro  Fluids (25-30 ml/kg): 1162-7074 ml    Subjective:   34F with hx of Leiomyoma of uterus now POD2 s/p abdominal myomectomy 10/6 complicated by significant blood loss due to the extent of the myomas removed. Postoperative course has been complicated by anemia and fevers which are now being treated with blood transfusion and antibiotics respectively. On assessment, pt was resting in bed without complaints. Currently on CLD. Diet just advanced this am and RD unable to assess PO intake. No noted n/v/d/c. No abd distention present. Education provided on importance of adqeuate PO intake with emphasis on lean protein to support wound healing. Pt noting good PO intake PTA. UBW consistent with admission weight. NFKA. Please see nutrition recs below. RD to follow up per protocol.     Nutrition Diagnosis: Increased pro needs RT increased nutrient demand 2/2 wound healing AEB s/p abdominal myomectomy     [  ] No active nutrition diagnosis at this time  [  ] Current medical condition precludes nutrition intervention    Goal: Consistently meet >75% est needs    Recommendations:  1. CLD  >> Cont to encourage adequate PO intake with emphasis on lean protein as diet advances  2. Pain and bowel regime optimization per team  3. Monitor lytes and replete prn  4. Trend weight biweekly  5. RD diet education reenforcement prn    Education: Education provided on importance of adqeuate PO intake with emphasis on lean protein to support wound healing    Risk Level: High [   ] Moderate [ x  ] Low [   ]

## 2020-10-08 NOTE — CHART NOTE - NSCHARTNOTEFT_GEN_A_CORE
PGY2    Patient received 2 units earlier this evening. A new order of PRBC and (in the computer or via down time form was never completed). However this evening, a third unit of PRBC was administered. This was a result of miscommunication between provider and nursing due to another postoperative patient on the floor requiring a transfusion. After speaking with blood bank, this third unit of PRBC should have not been released because the order was from 10/6 and stated "hold for OR." After reviewing the patient's vital signs and large blood loss during surgery, it was still determined that the patient will complete the third unit of blood.     D/w nursing and Dr. Jackie Leon List PGY2

## 2020-10-09 LAB
ANION GAP SERPL CALC-SCNC: 9 MMOL/L — SIGNIFICANT CHANGE UP (ref 5–17)
BUN SERPL-MCNC: 7 MG/DL — SIGNIFICANT CHANGE UP (ref 7–23)
CALCIUM SERPL-MCNC: 7.9 MG/DL — LOW (ref 8.4–10.5)
CHLORIDE SERPL-SCNC: 103 MMOL/L — SIGNIFICANT CHANGE UP (ref 96–108)
CO2 SERPL-SCNC: 25 MMOL/L — SIGNIFICANT CHANGE UP (ref 22–31)
CREAT SERPL-MCNC: 0.58 MG/DL — SIGNIFICANT CHANGE UP (ref 0.5–1.3)
GLUCOSE SERPL-MCNC: 91 MG/DL — SIGNIFICANT CHANGE UP (ref 70–99)
HCT VFR BLD CALC: 24.2 % — LOW (ref 34.5–45)
HCT VFR BLD CALC: 26.6 % — LOW (ref 34.5–45)
HGB BLD-MCNC: 7.7 G/DL — LOW (ref 11.5–15.5)
HGB BLD-MCNC: 8.7 G/DL — LOW (ref 11.5–15.5)
MCHC RBC-ENTMCNC: 27.4 PG — SIGNIFICANT CHANGE UP (ref 27–34)
MCHC RBC-ENTMCNC: 28.2 PG — SIGNIFICANT CHANGE UP (ref 27–34)
MCHC RBC-ENTMCNC: 31.8 GM/DL — LOW (ref 32–36)
MCHC RBC-ENTMCNC: 32.7 GM/DL — SIGNIFICANT CHANGE UP (ref 32–36)
MCV RBC AUTO: 86.1 FL — SIGNIFICANT CHANGE UP (ref 80–100)
MCV RBC AUTO: 86.1 FL — SIGNIFICANT CHANGE UP (ref 80–100)
NRBC # BLD: 0 /100 WBCS — SIGNIFICANT CHANGE UP (ref 0–0)
NRBC # BLD: 0 /100 WBCS — SIGNIFICANT CHANGE UP (ref 0–0)
PLATELET # BLD AUTO: 130 K/UL — LOW (ref 150–400)
PLATELET # BLD AUTO: 149 K/UL — LOW (ref 150–400)
POTASSIUM SERPL-MCNC: 3.8 MMOL/L — SIGNIFICANT CHANGE UP (ref 3.5–5.3)
POTASSIUM SERPL-SCNC: 3.8 MMOL/L — SIGNIFICANT CHANGE UP (ref 3.5–5.3)
RBC # BLD: 2.81 M/UL — LOW (ref 3.8–5.2)
RBC # BLD: 3.09 M/UL — LOW (ref 3.8–5.2)
RBC # FLD: 15 % — HIGH (ref 10.3–14.5)
RBC # FLD: 15.1 % — HIGH (ref 10.3–14.5)
SODIUM SERPL-SCNC: 137 MMOL/L — SIGNIFICANT CHANGE UP (ref 135–145)
WBC # BLD: 11.43 K/UL — HIGH (ref 3.8–10.5)
WBC # BLD: 12.6 K/UL — HIGH (ref 3.8–10.5)
WBC # FLD AUTO: 11.43 K/UL — HIGH (ref 3.8–10.5)
WBC # FLD AUTO: 12.6 K/UL — HIGH (ref 3.8–10.5)

## 2020-10-09 RX ORDER — SENNA PLUS 8.6 MG/1
1 TABLET ORAL
Refills: 0 | Status: DISCONTINUED | OUTPATIENT
Start: 2020-10-09 | End: 2020-10-10

## 2020-10-09 RX ORDER — FERROUS SULFATE 325(65) MG
325 TABLET ORAL THREE TIMES A DAY
Refills: 0 | Status: DISCONTINUED | OUTPATIENT
Start: 2020-10-09 | End: 2020-10-10

## 2020-10-09 RX ORDER — MAGNESIUM SULFATE 500 MG/ML
2 VIAL (ML) INJECTION ONCE
Refills: 0 | Status: COMPLETED | OUTPATIENT
Start: 2020-10-09 | End: 2020-10-09

## 2020-10-09 RX ORDER — POTASSIUM CHLORIDE 20 MEQ
20 PACKET (EA) ORAL ONCE
Refills: 0 | Status: COMPLETED | OUTPATIENT
Start: 2020-10-09 | End: 2020-10-09

## 2020-10-09 RX ORDER — IBUPROFEN 200 MG
600 TABLET ORAL EVERY 6 HOURS
Refills: 0 | Status: DISCONTINUED | OUTPATIENT
Start: 2020-10-09 | End: 2020-10-10

## 2020-10-09 RX ADMIN — Medication 600 MILLIGRAM(S): at 12:31

## 2020-10-09 RX ADMIN — Medication 100 MILLIGRAM(S): at 16:50

## 2020-10-09 RX ADMIN — Medication 1000 MILLIGRAM(S): at 22:10

## 2020-10-09 RX ADMIN — Medication 1000 MILLIGRAM(S): at 21:21

## 2020-10-09 RX ADMIN — Medication 100 MILLIGRAM(S): at 00:30

## 2020-10-09 RX ADMIN — Medication 600 MILLIGRAM(S): at 13:30

## 2020-10-09 RX ADMIN — Medication 30 MILLIGRAM(S): at 06:01

## 2020-10-09 RX ADMIN — Medication 50 GRAM(S): at 09:38

## 2020-10-09 RX ADMIN — Medication 1000 MILLIGRAM(S): at 09:26

## 2020-10-09 RX ADMIN — SENNA PLUS 1 TABLET(S): 8.6 TABLET ORAL at 09:26

## 2020-10-09 RX ADMIN — Medication 325 MILLIGRAM(S): at 19:20

## 2020-10-09 RX ADMIN — Medication 325 MILLIGRAM(S): at 21:21

## 2020-10-09 RX ADMIN — Medication 30 MILLIGRAM(S): at 07:36

## 2020-10-09 RX ADMIN — Medication 100 MILLIGRAM(S): at 07:44

## 2020-10-09 RX ADMIN — Medication 600 MILLIGRAM(S): at 19:19

## 2020-10-09 RX ADMIN — Medication 30 MILLIGRAM(S): at 00:39

## 2020-10-09 RX ADMIN — Medication 1000 MILLIGRAM(S): at 10:00

## 2020-10-09 RX ADMIN — Medication 30 MILLIGRAM(S): at 00:30

## 2020-10-09 RX ADMIN — Medication 1000 MILLIGRAM(S): at 16:40

## 2020-10-09 RX ADMIN — Medication 325 MILLIGRAM(S): at 09:26

## 2020-10-09 RX ADMIN — Medication 20 MILLIEQUIVALENT(S): at 09:46

## 2020-10-09 NOTE — PROGRESS NOTE ADULT - SUBJECTIVE AND OBJECTIVE BOX
Feels better today. Less pain  Afebrile VSS  Lungs scattered rales lower lung fields  Abdomen soft, less distended  Wound clean and dry  Voiding freely  Hb 8.4 all day yesterday, 7.7 this AM    Plan  OOB   Regular diet  Observe H&H  May D/C home in AM or even tonight if patient chooses

## 2020-10-09 NOTE — PROGRESS NOTE ADULT - SUBJECTIVE AND OBJECTIVE BOX
GYN Progress Note    Patient seen at bedside.  Pain controlled.  Tolerating regular diet.  OOB  Voiding  +flatus    Denies CP, palpitations, SOB, fever, chills, nausea, vomiting.    Vital Signs Last 24 Hrs  T(C): 36.9 (09 Oct 2020 05:10), Max: 37.7 (09 Oct 2020 00:42)  T(F): 98.4 (09 Oct 2020 05:10), Max: 99.9 (09 Oct 2020 00:42)  HR: 90 (09 Oct 2020 05:10) (90 - 104)  BP: 115/73 (09 Oct 2020 05:10) (107/58 - 122/86)  RR: 17 (09 Oct 2020 05:10) (15 - 19)  SpO2: 98% (09 Oct 2020 05:10) (95% - 99%)    Physical Exam:  Gen: No Acute Distress  Pulm: Clear to auscultation bilaterally  GI: soft, appropriately nontender, nondistended, +BS, no rebound, no guarding.  Incision C/D/I  Ext: SCDs in place, wnl    I&O's Summary    08 Oct 2020 07:01  -  09 Oct 2020 07:00  --------------------------------------------------------  IN: 415 mL / OUT: 1950 mL / NET: -1535 mL      MEDICATIONS  (STANDING):  acetaminophen   Tablet .. 1000 milliGRAM(s) Oral every 6 hours  BUpivacaine liposome 1.3% Injectable (no eMAR) 20 milliLiter(s) Local Injection once  ceFAZolin   IVPB      ceFAZolin   IVPB 2000 milliGRAM(s) IV Intermittent every 8 hours  ketorolac   Injectable 30 milliGRAM(s) IV Push every 6 hours    MEDICATIONS  (PRN):  diphenhydrAMINE 25 milliGRAM(s) Oral once PRN Rash and/or Itching  HYDROmorphone  Injectable 0.5 milliGRAM(s) IV Push every 15 minutes PRN Severe Pain (7 - 10)  ondansetron    Tablet 8 milliGRAM(s) Oral every 8 hours PRN Nausea and/or Vomiting  oxyCODONE    IR 5 milliGRAM(s) Oral every 3 hours PRN Moderate Pain (4 - 6)  oxyCODONE    IR 10 milliGRAM(s) Oral every 4 hours PRN Severe Pain (7 - 10)  senna 1 Tablet(s) Oral at bedtime PRN Constipation  simethicone 80 milliGRAM(s) Chew every 6 hours PRN Gas      LABS:                        7.7    11.43 )-----------( 130      ( 09 Oct 2020 06:44 )             24.2     10-08    136  |  101  |  8   ----------------------------<  94  3.6   |  25  |  0.65    Ca    7.8<L>      08 Oct 2020 10:30  Phos  2.2     10-08  Mg     1.7     10-08

## 2020-10-09 NOTE — PROGRESS NOTE ADULT - ASSESSMENT
33yo P0 POD3 s/p abdominal myomectomy complicated by significant blood loss due to the extent of the myomas removed. Postoperative course has been complicated by anemia and fevers which are now being treated with blood transfusion and antibiotics respectively.                               1. Neuro/Pain: Continue Tylenol 1000mg Q6H, oxycodone 5 &10mg prn, toradol 30mg Q6H   2  CV:  keep patient on telemetry for heart rate monitoring;   3. Pulm: Encourage ISS  4. GI: regular diet  5. : voiding without issue   6. Heme: Patient now status post transfusion of 3 units prbcs; Hgb stable at 8.4 throughout yesterday. AM CBC pending. no evidence of active bleeding on CT angio   7. ID: Patient febrile overnight- no blood cultures at this time per attending; continue Ancef 2g q8H  8. DVT ppx: SCDs. No Lovenox for now.  9. Dispo: continue inpatient care

## 2020-10-10 ENCOUNTER — TRANSCRIPTION ENCOUNTER (OUTPATIENT)
Age: 35
End: 2020-10-10

## 2020-10-10 VITALS
OXYGEN SATURATION: 99 % | TEMPERATURE: 99 F | SYSTOLIC BLOOD PRESSURE: 121 MMHG | RESPIRATION RATE: 18 BRPM | HEART RATE: 88 BPM

## 2020-10-10 DIAGNOSIS — Z98.890 OTHER SPECIFIED POSTPROCEDURAL STATES: ICD-10-CM

## 2020-10-10 LAB
HCT VFR BLD CALC: 23.5 % — LOW (ref 34.5–45)
HGB BLD-MCNC: 7.5 G/DL — LOW (ref 11.5–15.5)
MCHC RBC-ENTMCNC: 27.6 PG — SIGNIFICANT CHANGE UP (ref 27–34)
MCHC RBC-ENTMCNC: 31.9 GM/DL — LOW (ref 32–36)
MCV RBC AUTO: 86.4 FL — SIGNIFICANT CHANGE UP (ref 80–100)
NRBC # BLD: 0 /100 WBCS — SIGNIFICANT CHANGE UP (ref 0–0)
PLATELET # BLD AUTO: 170 K/UL — SIGNIFICANT CHANGE UP (ref 150–400)
RBC # BLD: 2.72 M/UL — LOW (ref 3.8–5.2)
RBC # FLD: 15.3 % — HIGH (ref 10.3–14.5)
WBC # BLD: 7.9 K/UL — SIGNIFICANT CHANGE UP (ref 3.8–10.5)
WBC # FLD AUTO: 7.9 K/UL — SIGNIFICANT CHANGE UP (ref 3.8–10.5)

## 2020-10-10 PROCEDURE — 74174 CTA ABD&PLVS W/CONTRAST: CPT

## 2020-10-10 PROCEDURE — 85027 COMPLETE CBC AUTOMATED: CPT

## 2020-10-10 PROCEDURE — 36415 COLL VENOUS BLD VENIPUNCTURE: CPT

## 2020-10-10 PROCEDURE — 85025 COMPLETE CBC W/AUTO DIFF WBC: CPT

## 2020-10-10 PROCEDURE — 86850 RBC ANTIBODY SCREEN: CPT

## 2020-10-10 PROCEDURE — 84100 ASSAY OF PHOSPHORUS: CPT

## 2020-10-10 PROCEDURE — 86901 BLOOD TYPING SEROLOGIC RH(D): CPT

## 2020-10-10 PROCEDURE — 80048 BASIC METABOLIC PNL TOTAL CA: CPT

## 2020-10-10 PROCEDURE — 86923 COMPATIBILITY TEST ELECTRIC: CPT

## 2020-10-10 PROCEDURE — 76700 US EXAM ABDOM COMPLETE: CPT

## 2020-10-10 PROCEDURE — 76856 US EXAM PELVIC COMPLETE: CPT

## 2020-10-10 PROCEDURE — 83735 ASSAY OF MAGNESIUM: CPT

## 2020-10-10 PROCEDURE — P9016: CPT

## 2020-10-10 PROCEDURE — 36430 TRANSFUSION BLD/BLD COMPNT: CPT

## 2020-10-10 PROCEDURE — 88305 TISSUE EXAM BY PATHOLOGIST: CPT

## 2020-10-10 PROCEDURE — 86900 BLOOD TYPING SEROLOGIC ABO: CPT

## 2020-10-10 RX ORDER — ACETAMINOPHEN 500 MG
2 TABLET ORAL
Qty: 0 | Refills: 0 | DISCHARGE
Start: 2020-10-10

## 2020-10-10 RX ORDER — FERROUS SULFATE 325(65) MG
1 TABLET ORAL
Qty: 60 | Refills: 0
Start: 2020-10-10

## 2020-10-10 RX ORDER — IBUPROFEN 200 MG
1 TABLET ORAL
Qty: 0 | Refills: 0 | DISCHARGE
Start: 2020-10-10

## 2020-10-10 RX ORDER — DOCUSATE SODIUM 100 MG
1 CAPSULE ORAL
Qty: 60 | Refills: 0
Start: 2020-10-10

## 2020-10-10 RX ADMIN — Medication 600 MILLIGRAM(S): at 06:34

## 2020-10-10 RX ADMIN — SENNA PLUS 1 TABLET(S): 8.6 TABLET ORAL at 06:34

## 2020-10-10 RX ADMIN — Medication 600 MILLIGRAM(S): at 13:00

## 2020-10-10 RX ADMIN — Medication 100 MILLIGRAM(S): at 06:34

## 2020-10-10 RX ADMIN — Medication 325 MILLIGRAM(S): at 06:34

## 2020-10-10 RX ADMIN — Medication 325 MILLIGRAM(S): at 14:10

## 2020-10-10 RX ADMIN — Medication 600 MILLIGRAM(S): at 00:37

## 2020-10-10 RX ADMIN — SIMETHICONE 80 MILLIGRAM(S): 80 TABLET, CHEWABLE ORAL at 11:53

## 2020-10-10 RX ADMIN — Medication 600 MILLIGRAM(S): at 01:31

## 2020-10-10 RX ADMIN — Medication 600 MILLIGRAM(S): at 11:54

## 2020-10-10 RX ADMIN — Medication 600 MILLIGRAM(S): at 07:26

## 2020-10-10 RX ADMIN — Medication 100 MILLIGRAM(S): at 00:37

## 2020-10-10 NOTE — PROGRESS NOTE ADULT - ASSESSMENT
35yo P0 POD4 s/p abdominal myomectomy complicated by significant blood loss due to the extent of the myomas removed, s/p 3u PRBC. Postoperative course has been complicated by anemia and fevers which are now being treated with blood transfusion and antibiotics respectively.  Patient has been afebrile, stable H/H. Is stable and doing well postoperatively.

## 2020-10-10 NOTE — PROGRESS NOTE ADULT - SUBJECTIVE AND OBJECTIVE BOX
Feels better, had BM yesterday and today  Afebrile VSS. No fever in 48 hours  Lungs clear  Abdomen soft, uterus enlarged probably due to subcapsular hematoma  Wound clean and dry  Voiding freely  Hb 7.7 ( fluctuating between 8.7 and 7.7)  Pt is no longer febrile and had needed antibiotic to prevent possible infection of subcapsular hematoma during episode of septicemia  Plan  D/C home  Tylenol # 3 sent to [facility]

## 2020-10-10 NOTE — PROGRESS NOTE ADULT - REASON FOR ADMISSION
Scheduled open abdominal myomectomy

## 2020-10-10 NOTE — DISCHARGE NOTE PROVIDER - NSDCMRMEDTOKEN_GEN_ALL_CORE_FT
acetaminophen 500 mg oral tablet: 2 tab(s) orally every 6 hours  ibuprofen 600 mg oral tablet: 1 tab(s) orally every 6 hours   acetaminophen 500 mg oral tablet: 2 tab(s) orally every 6 hours  Colace 100 mg oral capsule: 1 cap(s) orally every 12 hours MDD:2 Tablets  ferrous sulfate 325 mg (65 mg elemental iron) oral delayed release tablet: 1 tab(s) orally every 12 hours MDD:2 Tablets  ibuprofen 600 mg oral tablet: 1 tab(s) orally every 6 hours

## 2020-10-10 NOTE — DISCHARGE NOTE PROVIDER - HOSPITAL COURSE
35yo P0 admitted for postoperative monitoring s/p abdominal myomectomy for 26wk size uterus. EBL in case was 1200cc, cell saver was used (+526). Postoperative Hg dropped to 5.8 w/associated tachycardia. Patient received a total of 3units pRBC w/appropriate rise in hemoglobin and resolution of tachycardia. Patient is stable for discharge. 33yo P0 admitted for postoperative monitoring s/p abdominal myomectomy for 26wk size uterus. EBL in case was 1200cc, cell saver was used (+526). Postoperative Hg dropped to 5.8 w/associated tachycardia. Patient received a total of 3units pRBC w/appropriate rise in hemoglobin and resolution of tachycardia. Patient is stable for discharge. She will have close follow up outpatient.

## 2020-10-10 NOTE — DISCHARGE NOTE PROVIDER - PROVIDER TOKENS
NP Note discussed with  Primary Attending    Patient is a 85y old  Male who presents with a chief complaint of Covid + (07 Jun 2020 11:23)      INTERVAL HPI/OVERNIGHT EVENTS: no new complaints. COVID - times 2    MEDICATIONS  (STANDING):  enoxaparin Injectable 40 milliGRAM(s) SubCutaneous daily  folic acid 1 milliGRAM(s) Oral daily  furosemide    Tablet 20 milliGRAM(s) Oral daily  levothyroxine 25 MICROGram(s) Oral daily  polyethylene glycol 3350 17 Gram(s) Oral daily  sertraline 25 milliGRAM(s) Oral daily    MEDICATIONS  (PRN):  acetaminophen   Tablet .. 650 milliGRAM(s) Oral every 6 hours PRN Temp greater or equal to 38C (100.4F), Mild Pain (1 - 3)  ALBUTerol    90 MICROgram(s) HFA Inhaler 2 Puff(s) Inhalation every 6 hours PRN Shortness of Breath and/or Wheezing  guaiFENesin   Syrup  (Sugar-Free) 100 milliGRAM(s) Oral every 6 hours PRN Cough      __________________________________________________  REVIEW OF SYSTEMS:    CONSTITUTIONAL: No fever,   EYES: no acute visual disturbances  NECK: No pain or stiffness  RESPIRATORY: No cough; No shortness of breath  CARDIOVASCULAR: No chest pain, no palpitations  GASTROINTESTINAL: No pain. No nausea or vomiting; No diarrhea   NEUROLOGICAL: No headache or numbness, no tremors  MUSCULOSKELETAL: No joint pain, no muscle pain  GENITOURINARY: no dysuria, no frequency, no hesitancy  PSYCHIATRY: no depression , no anxiety  ALL OTHER  ROS negative        Vital Signs Last 24 Hrs  T(C): 36.2 (08 Jun 2020 06:17), Max: 36.8 (07 Jun 2020 12:41)  T(F): 97.2 (08 Jun 2020 06:17), Max: 98.2 (07 Jun 2020 12:41)  HR: 54 (08 Jun 2020 06:17) (54 - 58)  BP: 141/66 (08 Jun 2020 06:17) (123/59 - 141/66)  BP(mean): --  RR: 16 (08 Jun 2020 06:17) (16 - 18)  SpO2: 99% (08 Jun 2020 06:17) (99% - 100%)    ________________________________________________  PHYSICAL EXAM:  GENERAL: NAD  HEENT: Normocephalic;  conjunctivae and sclerae clear; moist mucous membranes;   NECK : supple  CHEST/LUNG: Clear to auscultation bilaterally with good air entry   HEART: S1 S2  regular; no murmurs, gallops or rubs  ABDOMEN: Soft, Nontender, Nondistended; Bowel sounds present  EXTREMITIES: no cyanosis; no edema; no calf tenderness  SKIN: warm and dry; no rash  NERVOUS SYSTEM:  Awake and alert; Oriented X 2    _________________________________________________  LABS:                        12.6   6.52  )-----------( 194      ( 07 Jun 2020 08:47 )             38.9     06-08    139  |  105  |  28<H>  ----------------------------<  77  3.7   |  26  |  0.99    Ca    9.0      08 Jun 2020 07:19          CAPILLARY BLOOD GLUCOSE            RADIOLOGY & ADDITIONAL TESTS:    Imaging Personally Reviewed:  YES  < from: Xray Chest 1 View- PORTABLE-Urgent (05.15.20 @ 15:26) >  Findings: Normal heart size. Clear lungs. No pneumothorax or pleural effusion. Air over the upper abdomen is likely in the patient's high positioned transverse colon.      Impression:  1.  No active pulmonary disease.    < end of copied text >      Consultant(s) Notes Reviewed:   YES    Care Discussed with Consultants :     Plan of care was discussed with patient and /or primary care giver; all questions and concerns were addressed and care was aligned with patient's wishes. PROVIDER:[TOKEN:[[id number]]]

## 2020-10-10 NOTE — PROGRESS NOTE ADULT - SUBJECTIVE AND OBJECTIVE BOX
R2 GYN Progress Note  POD#4   HD#5    Patient seen and examined at bedside.  No acute events overnight. No acute complaints.  Pain well controlled.  Patient is ambulating and tolerating regular diet.   Patient is passing flatus; + bowel movements.   Patient is voiding spontaneously.   Denies CP, SOB, N/V, fevers, and chills.    Vital Signs Last 24 Hours  T(C): 36.6 (10-10-20 @ 01:26), Max: 37.1 (10-09-20 @ 14:01)  HR: 96 (10-10-20 @ 01:26) (88 - 96)  BP: 104/59 (10-10-20 @ 01:26) (104/59 - 126/88)  RR: 18 (10-10-20 @ 01:26) (16 - 18)  SpO2: 98% (10-10-20 @ 01:26) (98% - 100%)    I&O's Summary    08 Oct 2020 07:01  -  09 Oct 2020 07:00  --------------------------------------------------------  IN: 415 mL / OUT: 1950 mL / NET: -1535 mL    09 Oct 2020 07:01  -  10 Oct 2020 06:18  --------------------------------------------------------  IN: 50 mL / OUT: 2100 mL / NET: -2050 mL        Physical Exam:  General: NAD  CV: RR, S1, S2  Lungs: CTA b/l, good air flow b/l   Abdomen: Soft, appropriately tender to palpation, softly distended, present bowel sounds in all 4 quadrants   Incision: low transverse incision  CDI  : no bleeding on pad  Ext: warm and well perfused    Labs:                        8.7    12.60 )-----------( 149      ( 09 Oct 2020 14:13 )             26.6                           7.7    11.43 )-----------( 130      ( 09 Oct 2020 06:44 )             24.2   baso x      eos x      imm gran x      lymph x      mono x      poly x                            MEDICATIONS  (STANDING):  acetaminophen   Tablet .. 1000 milliGRAM(s) Oral every 6 hours  BUpivacaine liposome 1.3% Injectable (no eMAR) 20 milliLiter(s) Local Injection once  ceFAZolin   IVPB      ceFAZolin   IVPB 2000 milliGRAM(s) IV Intermittent every 8 hours  ferrous    sulfate 325 milliGRAM(s) Oral three times a day  ibuprofen  Tablet. 600 milliGRAM(s) Oral every 6 hours  senna 1 Tablet(s) Oral two times a day    MEDICATIONS  (PRN):  HYDROmorphone  Injectable 0.5 milliGRAM(s) IV Push every 15 minutes PRN Severe Pain (7 - 10)  ondansetron    Tablet 8 milliGRAM(s) Oral every 8 hours PRN Nausea and/or Vomiting  oxyCODONE    IR 5 milliGRAM(s) Oral every 3 hours PRN Moderate Pain (4 - 6)  oxyCODONE    IR 10 milliGRAM(s) Oral every 4 hours PRN Severe Pain (7 - 10)  simethicone 80 milliGRAM(s) Chew every 6 hours PRN Gas

## 2020-10-10 NOTE — DISCHARGE NOTE PROVIDER - CARE PROVIDER_API CALL
Gregoria Mejia  OBSTETRICS AND GYNECOLOGY  [address] [place] [address], [address]  [place], [place] [address]  Phone: [phone]  Fax: [phone]  Follow Up Time:

## 2020-10-10 NOTE — PROGRESS NOTE ADULT - PROBLEM SELECTOR PLAN 1
1. Neuro/Pain: Continue Tylenol 1000mg / ibuprofen 600mg Q6H, oxycodone 5 &10mg prn,    2  CV:  tachycardia resolved; hemodynamically stable   3. Pulm: Encourage ISS  4. GI: regular diet  5. : voiding without issue   6. Heme: Patient now status post transfusion of 3 units prbcs; CBC pending. no evidence of active bleeding on CT angio   7. ID: patient continues to be afebrile; d/c Ancef   8. DVT ppx: SCDs. No Lovenox for now.  9. Dispo: discharge planning     Edward Monsivais PGY2

## 2020-10-10 NOTE — DISCHARGE NOTE NURSING/CASE MANAGEMENT/SOCIAL WORK - PATIENT PORTAL LINK FT
You can access the FollowMyHealth Patient Portal offered by [facility] by registering at the following website: [email or web address]. By joining [employer]’s FollowMyHealth portal, you will also be able to view your health information using other applications (apps) compatible with our system.

## 2020-10-15 LAB — SURGICAL PATHOLOGY STUDY: SIGNIFICANT CHANGE UP

## 2022-07-01 NOTE — BRIEF OPERATIVE NOTE - NSICDXBRIEFPOSTOP_GEN_ALL_CORE_FT
POST-OP DIAGNOSIS:  Abnormal uterine bleeding 06-Oct-2020 13:23:10  Angeles Holliday  Fibroid uterus 06-Oct-2020 13:22:45  Angeles Holliday   Yes

## 2023-12-30 ENCOUNTER — EMERGENCY (EMERGENCY)
Facility: HOSPITAL | Age: 38
LOS: 1 days | Discharge: HOME | End: 2023-12-30
Attending: EMERGENCY MEDICINE | Admitting: EMERGENCY MEDICINE
Payer: COMMERCIAL

## 2023-12-30 VITALS
TEMPERATURE: 98 F | WEIGHT: 162.04 LBS | OXYGEN SATURATION: 100 % | RESPIRATION RATE: 18 BRPM | SYSTOLIC BLOOD PRESSURE: 161 MMHG | HEART RATE: 95 BPM | DIASTOLIC BLOOD PRESSURE: 95 MMHG

## 2023-12-30 DIAGNOSIS — Z41.9 ENCOUNTER FOR PROCEDURE FOR PURPOSES OTHER THAN REMEDYING HEALTH STATE, UNSPECIFIED: Chronic | ICD-10-CM

## 2023-12-30 DIAGNOSIS — R10.9 UNSPECIFIED ABDOMINAL PAIN: ICD-10-CM

## 2023-12-30 DIAGNOSIS — N32.89 OTHER SPECIFIED DISORDERS OF BLADDER: ICD-10-CM

## 2023-12-30 DIAGNOSIS — M54.9 DORSALGIA, UNSPECIFIED: ICD-10-CM

## 2023-12-30 DIAGNOSIS — D25.9 LEIOMYOMA OF UTERUS, UNSPECIFIED: ICD-10-CM

## 2023-12-30 LAB
ALBUMIN SERPL ELPH-MCNC: 4.1 G/DL — SIGNIFICANT CHANGE UP (ref 3.3–5)
ALBUMIN SERPL ELPH-MCNC: 4.1 G/DL — SIGNIFICANT CHANGE UP (ref 3.3–5)
ALP SERPL-CCNC: 58 U/L — SIGNIFICANT CHANGE UP (ref 40–120)
ALP SERPL-CCNC: 58 U/L — SIGNIFICANT CHANGE UP (ref 40–120)
ALT FLD-CCNC: 13 U/L — SIGNIFICANT CHANGE UP (ref 10–45)
ALT FLD-CCNC: 13 U/L — SIGNIFICANT CHANGE UP (ref 10–45)
ANION GAP SERPL CALC-SCNC: 8 MMOL/L — SIGNIFICANT CHANGE UP (ref 5–17)
ANION GAP SERPL CALC-SCNC: 8 MMOL/L — SIGNIFICANT CHANGE UP (ref 5–17)
APPEARANCE UR: CLEAR — SIGNIFICANT CHANGE UP
APPEARANCE UR: CLEAR — SIGNIFICANT CHANGE UP
AST SERPL-CCNC: 18 U/L — SIGNIFICANT CHANGE UP (ref 10–40)
AST SERPL-CCNC: 18 U/L — SIGNIFICANT CHANGE UP (ref 10–40)
BACTERIA # UR AUTO: ABNORMAL /HPF
BACTERIA # UR AUTO: ABNORMAL /HPF
BASOPHILS # BLD AUTO: 0.04 K/UL — SIGNIFICANT CHANGE UP (ref 0–0.2)
BASOPHILS # BLD AUTO: 0.04 K/UL — SIGNIFICANT CHANGE UP (ref 0–0.2)
BASOPHILS NFR BLD AUTO: 0.8 % — SIGNIFICANT CHANGE UP (ref 0–2)
BASOPHILS NFR BLD AUTO: 0.8 % — SIGNIFICANT CHANGE UP (ref 0–2)
BILIRUB SERPL-MCNC: 1 MG/DL — SIGNIFICANT CHANGE UP (ref 0.2–1.2)
BILIRUB SERPL-MCNC: 1 MG/DL — SIGNIFICANT CHANGE UP (ref 0.2–1.2)
BILIRUB UR-MCNC: NEGATIVE — SIGNIFICANT CHANGE UP
BILIRUB UR-MCNC: NEGATIVE — SIGNIFICANT CHANGE UP
BUN SERPL-MCNC: 6 MG/DL — LOW (ref 7–23)
BUN SERPL-MCNC: 6 MG/DL — LOW (ref 7–23)
CALCIUM SERPL-MCNC: 9 MG/DL — SIGNIFICANT CHANGE UP (ref 8.4–10.5)
CALCIUM SERPL-MCNC: 9 MG/DL — SIGNIFICANT CHANGE UP (ref 8.4–10.5)
CAST: 1 /LPF — SIGNIFICANT CHANGE UP (ref 0–4)
CAST: 1 /LPF — SIGNIFICANT CHANGE UP (ref 0–4)
CHLORIDE SERPL-SCNC: 105 MMOL/L — SIGNIFICANT CHANGE UP (ref 96–108)
CHLORIDE SERPL-SCNC: 105 MMOL/L — SIGNIFICANT CHANGE UP (ref 96–108)
CO2 SERPL-SCNC: 25 MMOL/L — SIGNIFICANT CHANGE UP (ref 22–31)
CO2 SERPL-SCNC: 25 MMOL/L — SIGNIFICANT CHANGE UP (ref 22–31)
COLOR SPEC: YELLOW — SIGNIFICANT CHANGE UP
COLOR SPEC: YELLOW — SIGNIFICANT CHANGE UP
CREAT SERPL-MCNC: 0.64 MG/DL — SIGNIFICANT CHANGE UP (ref 0.5–1.3)
CREAT SERPL-MCNC: 0.64 MG/DL — SIGNIFICANT CHANGE UP (ref 0.5–1.3)
DIFF PNL FLD: NEGATIVE — SIGNIFICANT CHANGE UP
DIFF PNL FLD: NEGATIVE — SIGNIFICANT CHANGE UP
EGFR: 117 ML/MIN/1.73M2 — SIGNIFICANT CHANGE UP
EGFR: 117 ML/MIN/1.73M2 — SIGNIFICANT CHANGE UP
EOSINOPHIL # BLD AUTO: 0.05 K/UL — SIGNIFICANT CHANGE UP (ref 0–0.5)
EOSINOPHIL # BLD AUTO: 0.05 K/UL — SIGNIFICANT CHANGE UP (ref 0–0.5)
EOSINOPHIL NFR BLD AUTO: 1 % — SIGNIFICANT CHANGE UP (ref 0–6)
EOSINOPHIL NFR BLD AUTO: 1 % — SIGNIFICANT CHANGE UP (ref 0–6)
GLUCOSE SERPL-MCNC: 107 MG/DL — HIGH (ref 70–99)
GLUCOSE SERPL-MCNC: 107 MG/DL — HIGH (ref 70–99)
GLUCOSE UR QL: NEGATIVE MG/DL — SIGNIFICANT CHANGE UP
GLUCOSE UR QL: NEGATIVE MG/DL — SIGNIFICANT CHANGE UP
HCT VFR BLD CALC: 37.6 % — SIGNIFICANT CHANGE UP (ref 34.5–45)
HCT VFR BLD CALC: 37.6 % — SIGNIFICANT CHANGE UP (ref 34.5–45)
HGB BLD-MCNC: 12 G/DL — SIGNIFICANT CHANGE UP (ref 11.5–15.5)
HGB BLD-MCNC: 12 G/DL — SIGNIFICANT CHANGE UP (ref 11.5–15.5)
IMM GRANULOCYTES NFR BLD AUTO: 0.2 % — SIGNIFICANT CHANGE UP (ref 0–0.9)
IMM GRANULOCYTES NFR BLD AUTO: 0.2 % — SIGNIFICANT CHANGE UP (ref 0–0.9)
KETONES UR-MCNC: NEGATIVE MG/DL — SIGNIFICANT CHANGE UP
KETONES UR-MCNC: NEGATIVE MG/DL — SIGNIFICANT CHANGE UP
LEUKOCYTE ESTERASE UR-ACNC: ABNORMAL
LEUKOCYTE ESTERASE UR-ACNC: ABNORMAL
LIDOCAIN IGE QN: 21 U/L — SIGNIFICANT CHANGE UP (ref 7–60)
LIDOCAIN IGE QN: 21 U/L — SIGNIFICANT CHANGE UP (ref 7–60)
LYMPHOCYTES # BLD AUTO: 1.62 K/UL — SIGNIFICANT CHANGE UP (ref 1–3.3)
LYMPHOCYTES # BLD AUTO: 1.62 K/UL — SIGNIFICANT CHANGE UP (ref 1–3.3)
LYMPHOCYTES # BLD AUTO: 33.4 % — SIGNIFICANT CHANGE UP (ref 13–44)
LYMPHOCYTES # BLD AUTO: 33.4 % — SIGNIFICANT CHANGE UP (ref 13–44)
MCHC RBC-ENTMCNC: 25.4 PG — LOW (ref 27–34)
MCHC RBC-ENTMCNC: 25.4 PG — LOW (ref 27–34)
MCHC RBC-ENTMCNC: 31.9 GM/DL — LOW (ref 32–36)
MCHC RBC-ENTMCNC: 31.9 GM/DL — LOW (ref 32–36)
MCV RBC AUTO: 79.7 FL — LOW (ref 80–100)
MCV RBC AUTO: 79.7 FL — LOW (ref 80–100)
MONOCYTES # BLD AUTO: 0.62 K/UL — SIGNIFICANT CHANGE UP (ref 0–0.9)
MONOCYTES # BLD AUTO: 0.62 K/UL — SIGNIFICANT CHANGE UP (ref 0–0.9)
MONOCYTES NFR BLD AUTO: 12.8 % — SIGNIFICANT CHANGE UP (ref 2–14)
MONOCYTES NFR BLD AUTO: 12.8 % — SIGNIFICANT CHANGE UP (ref 2–14)
NEUTROPHILS # BLD AUTO: 2.51 K/UL — SIGNIFICANT CHANGE UP (ref 1.8–7.4)
NEUTROPHILS # BLD AUTO: 2.51 K/UL — SIGNIFICANT CHANGE UP (ref 1.8–7.4)
NEUTROPHILS NFR BLD AUTO: 51.8 % — SIGNIFICANT CHANGE UP (ref 43–77)
NEUTROPHILS NFR BLD AUTO: 51.8 % — SIGNIFICANT CHANGE UP (ref 43–77)
NITRITE UR-MCNC: NEGATIVE — SIGNIFICANT CHANGE UP
NITRITE UR-MCNC: NEGATIVE — SIGNIFICANT CHANGE UP
NRBC # BLD: 0 /100 WBCS — SIGNIFICANT CHANGE UP (ref 0–0)
NRBC # BLD: 0 /100 WBCS — SIGNIFICANT CHANGE UP (ref 0–0)
PH UR: 6 — SIGNIFICANT CHANGE UP (ref 5–8)
PH UR: 6 — SIGNIFICANT CHANGE UP (ref 5–8)
PLATELET # BLD AUTO: 266 K/UL — SIGNIFICANT CHANGE UP (ref 150–400)
PLATELET # BLD AUTO: 266 K/UL — SIGNIFICANT CHANGE UP (ref 150–400)
POTASSIUM SERPL-MCNC: 3.5 MMOL/L — SIGNIFICANT CHANGE UP (ref 3.5–5.3)
POTASSIUM SERPL-MCNC: 3.5 MMOL/L — SIGNIFICANT CHANGE UP (ref 3.5–5.3)
POTASSIUM SERPL-SCNC: 3.5 MMOL/L — SIGNIFICANT CHANGE UP (ref 3.5–5.3)
POTASSIUM SERPL-SCNC: 3.5 MMOL/L — SIGNIFICANT CHANGE UP (ref 3.5–5.3)
PROT SERPL-MCNC: 6.7 G/DL — SIGNIFICANT CHANGE UP (ref 6–8.3)
PROT SERPL-MCNC: 6.7 G/DL — SIGNIFICANT CHANGE UP (ref 6–8.3)
PROT UR-MCNC: NEGATIVE MG/DL — SIGNIFICANT CHANGE UP
PROT UR-MCNC: NEGATIVE MG/DL — SIGNIFICANT CHANGE UP
RBC # BLD: 4.72 M/UL — SIGNIFICANT CHANGE UP (ref 3.8–5.2)
RBC # BLD: 4.72 M/UL — SIGNIFICANT CHANGE UP (ref 3.8–5.2)
RBC # FLD: 14.5 % — SIGNIFICANT CHANGE UP (ref 10.3–14.5)
RBC # FLD: 14.5 % — SIGNIFICANT CHANGE UP (ref 10.3–14.5)
RBC CASTS # UR COMP ASSIST: 0 /HPF — SIGNIFICANT CHANGE UP (ref 0–4)
RBC CASTS # UR COMP ASSIST: 0 /HPF — SIGNIFICANT CHANGE UP (ref 0–4)
SODIUM SERPL-SCNC: 138 MMOL/L — SIGNIFICANT CHANGE UP (ref 135–145)
SODIUM SERPL-SCNC: 138 MMOL/L — SIGNIFICANT CHANGE UP (ref 135–145)
SP GR SPEC: 1.01 — SIGNIFICANT CHANGE UP (ref 1–1.03)
SP GR SPEC: 1.01 — SIGNIFICANT CHANGE UP (ref 1–1.03)
SQUAMOUS # UR AUTO: 4 /HPF — SIGNIFICANT CHANGE UP (ref 0–5)
SQUAMOUS # UR AUTO: 4 /HPF — SIGNIFICANT CHANGE UP (ref 0–5)
UROBILINOGEN FLD QL: 0.2 MG/DL — SIGNIFICANT CHANGE UP (ref 0.2–1)
UROBILINOGEN FLD QL: 0.2 MG/DL — SIGNIFICANT CHANGE UP (ref 0.2–1)
WBC # BLD: 4.85 K/UL — SIGNIFICANT CHANGE UP (ref 3.8–10.5)
WBC # BLD: 4.85 K/UL — SIGNIFICANT CHANGE UP (ref 3.8–10.5)
WBC # FLD AUTO: 4.85 K/UL — SIGNIFICANT CHANGE UP (ref 3.8–10.5)
WBC # FLD AUTO: 4.85 K/UL — SIGNIFICANT CHANGE UP (ref 3.8–10.5)
WBC UR QL: 3 /HPF — SIGNIFICANT CHANGE UP (ref 0–5)
WBC UR QL: 3 /HPF — SIGNIFICANT CHANGE UP (ref 0–5)

## 2023-12-30 PROCEDURE — 74176 CT ABD & PELVIS W/O CONTRAST: CPT | Mod: MA

## 2023-12-30 PROCEDURE — 85025 COMPLETE CBC W/AUTO DIFF WBC: CPT

## 2023-12-30 PROCEDURE — 99284 EMERGENCY DEPT VISIT MOD MDM: CPT | Mod: 25

## 2023-12-30 PROCEDURE — 81001 URINALYSIS AUTO W/SCOPE: CPT

## 2023-12-30 PROCEDURE — 36415 COLL VENOUS BLD VENIPUNCTURE: CPT

## 2023-12-30 PROCEDURE — 99284 EMERGENCY DEPT VISIT MOD MDM: CPT

## 2023-12-30 PROCEDURE — 80053 COMPREHEN METABOLIC PANEL: CPT

## 2023-12-30 PROCEDURE — 83690 ASSAY OF LIPASE: CPT

## 2023-12-30 PROCEDURE — 74176 CT ABD & PELVIS W/O CONTRAST: CPT | Mod: 26,MA

## 2023-12-30 RX ORDER — KETOROLAC TROMETHAMINE 30 MG/ML
15 SYRINGE (ML) INJECTION ONCE
Refills: 0 | Status: DISCONTINUED | OUTPATIENT
Start: 2023-12-30 | End: 2023-12-30

## 2023-12-30 NOTE — ED PROVIDER NOTE - PHYSICAL EXAMINATION
VITAL SIGNS: I have reviewed nursing notes and confirm.  CONSTITUTIONAL: Well appearing, in no acute distress.   SKIN:  warm and dry, no acute rash.   HEAD:  normocephalic, atraumatic.  EYES: EOM intact; conjunctiva and sclera clear.  ENT: No nasal discharge; airway clear.   NECK: Supple.  CARD: S1, S2, Regular rate and rhythm.   RESP:  Clear to auscultation b/l, no wheezes, rales or rhonchi.  ABD: Normal bowel sounds; soft; non-distended; non-tender; no guarding/ rebound.  BACK: No spinal tenderness. No cvat. Rash.   EXT: Normal ROM. No peripheral edema. Pulses intact and equal b/l.  NEURO: Alert, oriented, motor/ sensation intact and equal b/l.   PSYCH: Cooperative, mood and affect appropriate.

## 2023-12-30 NOTE — ED PROVIDER NOTE - CLINICAL SUMMARY MEDICAL DECISION MAKING FREE TEXT BOX
Impression: 39yo f, h/o uterine fibroids, who p/w r back pain starting 2d ago, intermittent, pressure like. h/o similar type pain to r flank/ ruq since August, usually related to eating and spontaneously resolves. Pain is worse w/ movement as well. No f/c, uri sx's, abd pain currently, vomiting, diarrhea, dysuria, hematuria, urinary urgency, frequency. No saddle anesthesia, n/t/w, bowel/ bladder dysfunction. Afebrile. HDS. Abd exam benign. No cvat. NVI. Ddx includes biliary colic, cholecystitis, uti/ pyelo, renal colic, muscular back pain... Plan for labs including lfts, lipase, ua, toradol for pain. Consider ruq us to r/o gallstones or ct a/p to r/o kidney stones. Will continue to monitor. DC instructions Impression: 37yo f, h/o uterine fibroids, who p/w r back pain starting 2d ago, intermittent, pressure like. h/o similar type pain to r flank/ ruq since August, usually related to eating and spontaneously resolves. Pain is worse w/ movement as well. No f/c, uri sx's, abd pain currently, vomiting, diarrhea, dysuria, hematuria, urinary urgency, frequency. No saddle anesthesia, n/t/w, bowel/ bladder dysfunction. Afebrile. HDS. Abd exam benign. No cvat. NVI. Ddx includes biliary colic, cholecystitis, uti/ pyelo, renal colic, muscular back pain... Plan for labs including lfts, lipase, ua, toradol for pain. Consider ruq us to r/o gallstones or ct a/p to r/o kidney stones. Will continue to monitor.    Labs unremarkable w/ normal wbc, hg/hct, renal function. CT a/p neg for hydronephrosis or nephrolithiasis.  Bladder wall thickening. Cannot exclude cystitis. UA neg for infection; doubt cystitis. ED evaluation and management discussed with the patient in detail. Suspect likely muscular back pain.  Close PMD follow up encouraged.  Strict ED return instructions discussed in detail and patient given the opportunity to ask any questions about their discharge diagnosis and instructions. Patient verbalized understanding.

## 2023-12-30 NOTE — ED ADULT NURSE NOTE - OBJECTIVE STATEMENT
Patient came to ER c/o "right lower back pain". Patient denies NVD, fevers and chills but states sometimes she feels worse with meals.

## 2023-12-30 NOTE — ED PROVIDER NOTE - PATIENT PORTAL LINK FT
You can access the FollowMyHealth Patient Portal offered by [facility] by registering at the following website: [email or web address]. By joining [employer]’s FollowMyHealth portal, you will also be able to view your health information using other applications (apps) compatible with our system. You can access the FollowMyHealth Patient Portal offered by [facility] by registering at the following website: [email or web address]. By joining [employer]’s FollowMyHealth portal, you will also be able to view your health information using other applications (apps) compatible with our system.

## 2023-12-30 NOTE — ED ADULT NURSE NOTE - NSFALLUNIVINTERV_ED_ALL_ED
Bed/Stretcher in lowest position, wheels locked, appropriate side rails in place/Call bell, personal items and telephone in reach/Instruct patient to call for assistance before getting out of bed/chair/stretcher/Non-slip footwear applied when patient is off stretcher/[place] to call system/Physically safe environment - no spills, clutter or unnecessary equipment/Purposeful proactive rounding/Room/bathroom lighting operational, light cord in reach Bed/Stretcher in lowest position, wheels locked, appropriate side rails in place/Call bell, personal items and telephone in reach/Instruct patient to call for assistance before getting out of bed/chair/stretcher/Non-slip footwear applied when patient is off stretcher/[place] to call system/Physically safe environment - no spills, clutter or unnecessary equipment/Purposeful proactive rounding/Room/bathroom lighting operational, light cord in reach

## 2023-12-30 NOTE — ED PROVIDER NOTE - OBJECTIVE STATEMENT
Pt is a 37yo f, h/o uterine fibroids, who p/w r back pain starting 2d ago, intermittent, pressure like. h/o similar type pain to r flank/ ruq since August, usually related to eating and spontaneously resolves. Pain is worse w/ movement as well. No f/c, uri sx's, abd pain currently, vomiting, diarrhea, dysuria, hematuria, urinary urgency, frequency. No saddle anesthesia, n/t/w, bowel/ bladder dysfunction. Pt is a 39yo f, h/o uterine fibroids, who p/w r back pain starting 2d ago, intermittent, pressure like. h/o similar type pain to r flank/ ruq since August, usually related to eating and spontaneously resolves. Pain is worse w/ movement as well. No f/c, uri sx's, abd pain currently, vomiting, diarrhea, dysuria, hematuria, urinary urgency, frequency. No saddle anesthesia, n/t/w, bowel/ bladder dysfunction.

## 2023-12-30 NOTE — ED PROVIDER NOTE - NSFOLLOWUPINSTRUCTIONS_ED_ALL_ED_FT
Take ibuprofen/ tylenol as needed for pain.  Avoid heavy lifting/ straining.  Follow up with your primary care physician for re-evaluation.  Return to er for any new or worsening symptoms.     Acute Back Pain, Adult  Acute back pain is sudden and usually short-lived. It is often caused by an injury to the muscles and tissues in the back. The injury may result from:  A muscle, tendon, or ligament getting overstretched or torn. Ligaments are tissues that connect bones to each other. Lifting something improperly can cause a back strain.  Wear and tear (degeneration) of the spinal disks. Spinal disks are circular tissue that provide cushioning between the bones of the spine (vertebrae).  Twisting motions, such as while playing sports or doing yard work.  A hit to the back.  Arthritis.  You may have a physical exam, lab tests, and imaging tests to find the cause of your pain. Acute back pain usually goes away with rest and home care.    Follow these instructions at home:  Managing pain, stiffness, and swelling    Take over-the-counter and prescription medicines only as told by your health care provider. Treatment may include medicines for pain and inflammation that are taken by mouth or applied to the skin, or muscle relaxants.  Your health care provider may recommend applying ice during the first 24–48 hours after your pain starts. To do this:  Put ice in a plastic bag.  Place a towel between your skin and the bag.  Leave the ice on for 20 minutes, 2–3 times a day.  Remove the ice if your skin turns bright red. This is very important. If you cannot feel pain, heat, or cold, you have a greater risk of damage to the area.  If directed, apply heat to the affected area as often as told by your health care provider. Use the heat source that your health care provider recommends, such as a moist heat pack or a heating pad.  Place a towel between your skin and the heat source.  Leave the heat on for 20–30 minutes.  Remove the heat if your skin turns bright red. This is especially important if you are unable to feel pain, heat, or cold. You have a greater risk of getting burned.  Activity    Comparisons of good and bad posture while driving, standing, sitting at a desk, and lifting heavy objects.  Do not stay in bed. Staying in bed for more than 1–2 days can delay your recovery.  Sit up and stand up straight. Avoid leaning forward when you sit or hunching over when you stand.  If you work at a desk, sit close to it so you do not need to lean over. Keep your chin tucked in. Keep your neck drawn back, and keep your elbows bent at a 90-degree angle (right angle).  Sit high and close to the steering wheel when you drive. Add lower back (lumbar) support to your car seat, if needed.  Take short walks on even surfaces as soon as you are able. Try to increase the length of time you walk each day.  Do not sit, drive, or  one place for more than 30 minutes at a time. Sitting or standing for long periods of time can put stress on your back.  Do not drive or use heavy machinery while taking prescription pain medicine.  Use proper lifting techniques. When you bend and lift, use positions that put less stress on your back:  Bend your knees.  Keep the load close to your body.  Avoid twisting.  Exercise regularly as told by your health care provider. Exercising helps your back heal faster and helps prevent back injuries by keeping muscles strong and flexible.  Work with a physical therapist to make a safe exercise program, as recommended by your health care provider. Do any exercises as told by your physical therapist.  Lifestyle    Maintain a healthy weight. Extra weight puts stress on your back and makes it difficult to have good posture.  Avoid activities or situations that make you feel anxious or stressed. Stress and anxiety increase muscle tension and can make back pain worse. Learn ways to manage anxiety and stress, such as through exercise.  General instructions    Sleep on a firm mattress in a comfortable position. Try lying on your side with your knees slightly bent. If you lie on your back, put a pillow under your knees.  Keep your head and neck in a straight line with your spine (neutral position) when using electronic equipment like smartphones or pads. To do this:  Raise your smartphone or pad to look at it instead of bending your head or neck to look down.  Put the smartphone or pad at the level of your face while looking at the screen.  Follow your treatment plan as told by your health care provider. This may include:  Cognitive or behavioral therapy.  Acupuncture or massage therapy.  Meditation or yoga.  Contact a health care provider if:  You have pain that is not relieved with rest or medicine.  You have increasing pain going down into your legs or buttocks.  Your pain does not improve after 2 weeks.  You have pain at night.  You lose weight without trying.  You have a fever or chills.  You develop nausea or vomiting.  You develop abdominal pain.  Get help right away if:  You develop new bowel or bladder control problems.  You have unusual weakness or numbness in your arms or legs.  You feel faint.  These symptoms may represent a serious problem that is an emergency. Do not wait to see if the symptoms will go away. Get medical help right away. Call your local emergency services (911 in the [place] Do not drive yourself to the hospital.    Summary  Acute back pain is sudden and usually short-lived.  Use proper lifting techniques. When you bend and lift, use positions that put less stress on your back.  Take over-the-counter and prescription medicines only as told by your health care provider, and apply heat or ice as told.  This information is not intended to replace advice given to you by your health care provider. Make sure you discuss any questions you have with your health care provider.    Document Revised: 03/11/2022 Document Reviewed: 03/11/2022  Elsevier Patient Education © 2023 [employer]

## 2023-12-31 VITALS
RESPIRATION RATE: 18 BRPM | DIASTOLIC BLOOD PRESSURE: 69 MMHG | HEART RATE: 81 BPM | OXYGEN SATURATION: 100 % | TEMPERATURE: 98 F | SYSTOLIC BLOOD PRESSURE: 155 MMHG

## 2023-12-31 PROBLEM — D25.9 LEIOMYOMA OF UTERUS, UNSPECIFIED: Chronic | Status: ACTIVE | Noted: 2020-10-05

## 2024-01-23 PROBLEM — Z00.00 ENCOUNTER FOR PREVENTIVE HEALTH EXAMINATION: Status: ACTIVE | Noted: 2024-01-23

## 2024-01-24 ENCOUNTER — APPOINTMENT (OUTPATIENT)
Dept: UROLOGY | Facility: CLINIC | Age: 39
End: 2024-01-24
Payer: COMMERCIAL

## 2024-01-24 VITALS
WEIGHT: 160 LBS | HEART RATE: 85 BPM | HEIGHT: 66 IN | BODY MASS INDEX: 25.71 KG/M2 | DIASTOLIC BLOOD PRESSURE: 90 MMHG | OXYGEN SATURATION: 94 % | SYSTOLIC BLOOD PRESSURE: 141 MMHG

## 2024-01-24 DIAGNOSIS — R03.0 ELEVATED BLOOD-PRESSURE READING, W/OUT DIAGNOSIS OF HYPERTENSION: ICD-10-CM

## 2024-01-24 DIAGNOSIS — N32.89 OTHER SPECIFIED DISORDERS OF BLADDER: ICD-10-CM

## 2024-01-24 DIAGNOSIS — Z82.49 FAMILY HISTORY OF ISCHEMIC HEART DISEASE AND OTHER DISEASES OF THE CIRCULATORY SYSTEM: ICD-10-CM

## 2024-01-24 DIAGNOSIS — Z78.9 OTHER SPECIFIED HEALTH STATUS: ICD-10-CM

## 2024-01-24 PROCEDURE — 99204 OFFICE O/P NEW MOD 45 MIN: CPT

## 2024-01-24 PROCEDURE — 51798 US URINE CAPACITY MEASURE: CPT

## 2024-01-24 RX ORDER — MULTIVITAMIN
CAPSULE ORAL
Refills: 0 | Status: ACTIVE | COMMUNITY

## 2024-01-24 NOTE — PHYSICAL EXAM
[Abdomen Soft] : soft [Abdomen Tenderness] : non-tender [] : no hepato-splenomegaly [Abdomen Mass (___ Cm)] : no abdominal mass palpated [Costovertebral Angle Tenderness] : no ~M costovertebral angle tenderness [de-identified] : PVR  0

## 2024-01-24 NOTE — HISTORY OF PRESENT ILLNESS
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 complaining of severe  right flank pain CT scan was normal with exception of bladder wall thickening Pain resolved has resolved Denies any urinary symptoms Patient had been treated for bacterial vaginosis   [Urinary Incontinence] : no urinary incontinence [Urinary Retention] : no urinary retention [Urinary Urgency] : no urinary urgency [Urinary Frequency] : no urinary frequency [Nocturia] : no nocturia [Straining] : no straining [Weak Stream] : no weak stream [Intermittency] : no intermittency [Post-Void Dribbling] : no post-void dribbling

## 2024-01-24 NOTE — ASSESSMENT
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  CT scan was obtained at the time of evaluation in the emergency room for right flank pain.  The pain has resolved.  There is no diagnosis associated with it.  She denies any urinary symptoms. She has no residual urine.  We discussed these findings.  CT scan was reviewed with the patient demonstrating the symmetric thickening of the bladder without any nodularity.  We discussed the fact that in the absence of urinary symptoms or findings on urinalysis no further evaluation would be warranted.  We had extensive discussion regarding her high blood pressure.  This has been noted on several occasions.  She has not had any intervention.  Emphasized the importance of this and the need to follow-up with her primary care in light of recurrent elevated bp and family history  Plan: urinalysis urine culture urine cytology PCP re BP The DOMINGA RANGEL  expressed fully understanding of the information provided, the consequences and the management.

## 2024-01-25 LAB
APPEARANCE: CLEAR
BACTERIA: NEGATIVE /HPF
BILIRUBIN URINE: NEGATIVE
BLOOD URINE: NEGATIVE
CAST: 0 /LPF
COLOR: YELLOW
EPITHELIAL CELLS: 3 /HPF
GLUCOSE QUALITATIVE U: NEGATIVE MG/DL
KETONES URINE: NEGATIVE MG/DL
LEUKOCYTE ESTERASE URINE: NEGATIVE
MICROSCOPIC-UA: NORMAL
NITRITE URINE: NEGATIVE
PH URINE: 7
PROTEIN URINE: NEGATIVE MG/DL
RED BLOOD CELLS URINE: 1 /HPF
SPECIFIC GRAVITY URINE: 1.02
UROBILINOGEN URINE: 0.2 MG/DL
WHITE BLOOD CELLS URINE: 0 /HPF

## 2024-01-26 LAB — URINE CYTOLOGY: NORMAL

## 2024-01-28 DIAGNOSIS — N39.0 URINARY TRACT INFECTION, SITE NOT SPECIFIED: ICD-10-CM

## 2024-01-28 DIAGNOSIS — A49.9 URINARY TRACT INFECTION, SITE NOT SPECIFIED: ICD-10-CM

## 2024-01-28 LAB — BACTERIA UR CULT: ABNORMAL

## 2024-01-28 RX ORDER — NITROFURANTOIN MACROCRYSTALS 100 MG/1
100 CAPSULE ORAL 4 TIMES DAILY
Qty: 28 | Refills: 3 | Status: ACTIVE | COMMUNITY
Start: 2024-01-28 | End: 1900-01-01

## 2024-01-30 ENCOUNTER — NON-APPOINTMENT (OUTPATIENT)
Age: 39
End: 2024-01-30
